# Patient Record
Sex: FEMALE | Race: WHITE | NOT HISPANIC OR LATINO | Employment: FULL TIME | URBAN - METROPOLITAN AREA
[De-identification: names, ages, dates, MRNs, and addresses within clinical notes are randomized per-mention and may not be internally consistent; named-entity substitution may affect disease eponyms.]

---

## 2018-03-09 ENCOUNTER — HOSPITAL ENCOUNTER (OUTPATIENT)
Dept: MAMMOGRAPHY | Facility: HOSPITAL | Age: 63
Discharge: HOME | End: 2018-03-09
Attending: FAMILY MEDICINE | Admitting: FAMILY MEDICINE

## 2020-01-23 ENCOUNTER — OFFICE VISIT (OUTPATIENT)
Dept: FAMILY MEDICINE CLINIC | Facility: CLINIC | Age: 65
End: 2020-01-23

## 2020-01-23 VITALS
HEIGHT: 63 IN | OXYGEN SATURATION: 96 % | RESPIRATION RATE: 18 BRPM | SYSTOLIC BLOOD PRESSURE: 117 MMHG | DIASTOLIC BLOOD PRESSURE: 78 MMHG | BODY MASS INDEX: 36.14 KG/M2 | WEIGHT: 204 LBS | TEMPERATURE: 98 F | HEART RATE: 85 BPM

## 2020-01-23 DIAGNOSIS — E78.5 DYSLIPIDEMIA: ICD-10-CM

## 2020-01-23 DIAGNOSIS — Z00.00 PHYSICAL EXAM: Primary | ICD-10-CM

## 2020-01-23 DIAGNOSIS — Z12.31 VISIT FOR SCREENING MAMMOGRAM: ICD-10-CM

## 2020-01-23 PROBLEM — Z13.220 ENCOUNTER FOR LIPID SCREENING FOR CARDIOVASCULAR DISEASE: Status: ACTIVE | Noted: 2018-01-22

## 2020-01-23 PROBLEM — Z13.6 ENCOUNTER FOR LIPID SCREENING FOR CARDIOVASCULAR DISEASE: Status: ACTIVE | Noted: 2018-01-22

## 2020-01-23 PROBLEM — E66.3 OVER WEIGHT: Status: ACTIVE | Noted: 2018-01-22

## 2020-01-23 PROBLEM — R92.8 OTHER ABNORMAL AND INCONCLUSIVE FINDINGS ON DIAGNOSTIC IMAGING OF BREAST: Status: ACTIVE | Noted: 2018-02-05

## 2020-01-23 PROBLEM — Z83.3 FAMILY HISTORY OF DIABETES MELLITUS IN FIRST DEGREE RELATIVE: Status: ACTIVE | Noted: 2018-01-22

## 2020-01-23 LAB
BILIRUB BLD-MCNC: NEGATIVE MG/DL
CLARITY, POC: CLEAR
COLOR UR: YELLOW
GLUCOSE UR STRIP-MCNC: NEGATIVE MG/DL
KETONES UR QL: NEGATIVE
LEUKOCYTE EST, POC: NEGATIVE
NITRITE UR-MCNC: NEGATIVE MG/ML
PH UR: 7.5 [PH] (ref 5–8)
PROT UR STRIP-MCNC: NEGATIVE MG/DL
RBC # UR STRIP: NEGATIVE /UL
SP GR UR: 1.02 (ref 1–1.03)
UROBILINOGEN UR QL: NORMAL

## 2020-01-23 PROCEDURE — 99396 PREV VISIT EST AGE 40-64: CPT | Performed by: FAMILY MEDICINE

## 2020-01-23 PROCEDURE — 81003 URINALYSIS AUTO W/O SCOPE: CPT | Performed by: FAMILY MEDICINE

## 2020-01-24 NOTE — PROGRESS NOTES
Subjective   Emily Drake is a 64 y.o. female.     Chief Complaint   Patient presents with   • Annual Exam         Current Outpatient Medications:   •  Calcium Carbonate-Vitamin D (CALCIUM 600+D) 600-200 MG-UNIT tablet, Daily., Disp: , Rfl:   •  Multiple Vitamins-Minerals (CENTRUM SILVER 50+WOMEN) tablet, Daily., Disp: , Rfl:     Past Medical History:   Diagnosis Date   • Overweight        Past Surgical History:   Procedure Laterality Date   • COLONOSCOPY  2013    NEG= 2013, Rech 2023 GSI       Family History   Problem Relation Age of Onset   • Diabetes Other         1ST DEGREE RELATIVE   • Arthritis Mother    • Hypertension Mother    • Other Mother         ESRD   • Hypertension Father    • Dementia Father    • Diabetes type II Father    • Other Sister         ARDS, DDDD   • Rheum arthritis Sister    • Sjogren's syndrome Sister    • Diabetes type II Brother        Social History     Socioeconomic History   • Marital status:      Spouse name: Not on file   • Number of children: Not on file   • Years of education: Not on file   • Highest education level: Not on file   Tobacco Use   • Smoking status: Never Smoker   • Smokeless tobacco: Never Used   Substance and Sexual Activity   • Alcohol use: Never     Frequency: Never       63 y/o C female here for annual PE/ Pap       The following portions of the patient's history were reviewed and updated as appropriate: allergies, current medications, past family history, past medical history, past social history, past surgical history and problem list.    Review of Systems   Constitutional: Negative.    HENT: Negative for congestion, ear pain, hearing loss, nosebleeds, postnasal drip, rhinorrhea, sinus pressure, sneezing, sore throat, tinnitus and trouble swallowing.    Eyes: Negative for blurred vision and double vision.   Respiratory: Negative for cough and shortness of breath.    Cardiovascular: Negative for chest pain.   Gastrointestinal: Negative for abdominal pain,  constipation, diarrhea, nausea, vomiting, GERD and indigestion.   Genitourinary: Negative for difficulty urinating, dysuria, flank pain, frequency, urgency and urinary incontinence.   Musculoskeletal: Negative for arthralgias and myalgias.   Skin: Negative for rash and skin lesions.   Neurological: Negative for weakness, memory problem and confusion.   Psychiatric/Behavioral: Negative for agitation, self-injury, suicidal ideas, depressed mood and stress. The patient is not nervous/anxious.        Vitals:    01/23/20 0905   BP: 117/78   Pulse: 85   Resp: 18   Temp: 98 °F (36.7 °C)   SpO2: 96%       Objective   Physical Exam   Constitutional: She is oriented to person, place, and time. She appears well-developed and well-nourished.   HENT:   Head: Normocephalic and atraumatic.   Right Ear: External ear normal.   Left Ear: External ear normal.   Nose: Nose normal.   Mouth/Throat: Oropharynx is clear and moist.   Eyes: Pupils are equal, round, and reactive to light. Conjunctivae and EOM are normal.   Neck: Normal range of motion. Neck supple. No thyromegaly present.   Cardiovascular: Normal rate, regular rhythm, normal heart sounds and intact distal pulses.   No murmur heard.  Pulmonary/Chest: Effort normal and breath sounds normal. No stridor. No respiratory distress. She has no wheezes. She has no rales. Right breast exhibits no inverted nipple, no mass, no nipple discharge, no skin change and no tenderness. Left breast exhibits no inverted nipple, no mass, no nipple discharge, no skin change and no tenderness. No breast swelling, tenderness, discharge or bleeding.   Abdominal: Soft. Bowel sounds are normal. She exhibits no distension and no mass. There is no tenderness. There is no rebound and no guarding. No hernia. Hernia confirmed negative in the right inguinal area and confirmed negative in the left inguinal area.   Genitourinary: Rectum normal, vagina normal and uterus normal. Rectal exam shows guaiac negative  stool. Pelvic exam was performed with patient supine. There is no rash or lesion on the right labia. There is no rash or lesion on the left labia. Uterus is anteverted. Cervix does not exhibit motion tenderness, discharge, friability, lesion, polyp or eversion. Right adnexum displays no mass, no tenderness and no fullness. Left adnexum displays no mass, no tenderness and no fullness. Vagina exhibits no lesion and no loss of rugae. No erythema, tenderness or bleeding in the vagina. No foreign body in the vagina. No vaginal discharge found. No cystocele or rectocele present.  Musculoskeletal: Normal range of motion. She exhibits no edema or tenderness.   Lymphadenopathy:     She has no cervical adenopathy.        Right: No inguinal adenopathy present.        Left: No inguinal adenopathy present.   Neurological: She is alert and oriented to person, place, and time. She displays normal reflexes. No cranial nerve deficit. She exhibits normal muscle tone.   Skin: Skin is warm and dry. Capillary refill takes less than 2 seconds. No rash noted. No erythema.   Psychiatric: She has a normal mood and affect. Her behavior is normal. Judgment and thought content normal.   Nursing note and vitals reviewed.        Assessment/Plan   Emily was seen today for annual exam.    Diagnoses and all orders for this visit:    Physical exam  -     POCT urinalysis dipstick, automated  -     PapIG HPV Age Gdln ACOG; Future  -     PapIG HPV Age Gdln ACOG  -     PapIG, HPV, Rfx 16 / 18; Future  -     PapIG, HPV, Rfx 16 / 18    Dyslipidemia    Visit for screening mammogram  -     Mammo Screening Digital Tomosynthesis Bilateral With CAD; Future    Disc'd healthy diet and exercise w/ pt

## 2020-01-28 LAB
AGE GDLN ACOG TESTING: NORMAL
CHROM ANALY OVERALL INTERP-IMP: NORMAL
CONV .: NORMAL
CONV PERFORMED BY:: NORMAL
DX ICD CODE: NORMAL
HIV 1 & 2 AB SER-IMP: NORMAL
HPV I/H RISK 1 DNA CVX QL PROBE+SIG AMP: NEGATIVE
REF LAB TEST METHOD: NORMAL
STAT OF ADQ CVX/VAG CYTO-IMP: NORMAL

## 2021-01-18 ENCOUNTER — TELEPHONE (OUTPATIENT)
Dept: FAMILY MEDICINE CLINIC | Facility: CLINIC | Age: 66
End: 2021-01-18

## 2021-01-18 DIAGNOSIS — Z13.220 SCREENING FOR LIPID DISORDERS: Primary | ICD-10-CM

## 2021-01-18 NOTE — TELEPHONE ENCOUNTER
Patient called and stated she has a PE appointment on 1/26/2021 and would like to know if you want labs on her prior to the appointment.  She is requesting they be ordered so she can schedule to come in and get them done.

## 2021-01-18 NOTE — TELEPHONE ENCOUNTER
HUB TO READ    Called pt to inform and to schedule nurse appointment for lab work.  Okay to transfer patient to office to schedule.

## 2021-01-19 ENCOUNTER — CLINICAL SUPPORT (OUTPATIENT)
Dept: FAMILY MEDICINE CLINIC | Facility: CLINIC | Age: 66
End: 2021-01-19

## 2021-01-19 DIAGNOSIS — Z13.220 SCREENING FOR LIPID DISORDERS: ICD-10-CM

## 2021-01-19 PROCEDURE — 80053 COMPREHEN METABOLIC PANEL: CPT | Performed by: FAMILY MEDICINE

## 2021-01-19 PROCEDURE — 80061 LIPID PANEL: CPT | Performed by: FAMILY MEDICINE

## 2021-01-19 PROCEDURE — 36415 COLL VENOUS BLD VENIPUNCTURE: CPT | Performed by: FAMILY MEDICINE

## 2021-01-20 LAB
ALBUMIN SERPL-MCNC: 4.3 G/DL (ref 3.5–5.2)
ALBUMIN/GLOB SERPL: 1.8 G/DL
ALP SERPL-CCNC: 132 U/L (ref 39–117)
ALT SERPL W P-5'-P-CCNC: 18 U/L (ref 1–33)
ANION GAP SERPL CALCULATED.3IONS-SCNC: 8.9 MMOL/L (ref 5–15)
AST SERPL-CCNC: 17 U/L (ref 1–32)
BILIRUB SERPL-MCNC: 0.2 MG/DL (ref 0–1.2)
BUN SERPL-MCNC: 13 MG/DL (ref 8–23)
BUN/CREAT SERPL: 18.6 (ref 7–25)
CALCIUM SPEC-SCNC: 9.4 MG/DL (ref 8.6–10.5)
CHLORIDE SERPL-SCNC: 108 MMOL/L (ref 98–107)
CHOLEST SERPL-MCNC: 157 MG/DL (ref 0–200)
CO2 SERPL-SCNC: 25.1 MMOL/L (ref 22–29)
CREAT SERPL-MCNC: 0.7 MG/DL (ref 0.57–1)
GFR SERPL CREATININE-BSD FRML MDRD: 85 ML/MIN/1.73
GLOBULIN UR ELPH-MCNC: 2.4 GM/DL
GLUCOSE SERPL-MCNC: 99 MG/DL (ref 65–99)
HDLC SERPL-MCNC: 53 MG/DL (ref 40–60)
LDLC SERPL CALC-MCNC: 79 MG/DL (ref 0–100)
LDLC/HDLC SERPL: 1.43 {RATIO}
POTASSIUM SERPL-SCNC: 4.3 MMOL/L (ref 3.5–5.2)
PROT SERPL-MCNC: 6.7 G/DL (ref 6–8.5)
SODIUM SERPL-SCNC: 142 MMOL/L (ref 136–145)
TRIGL SERPL-MCNC: 141 MG/DL (ref 0–150)
VLDLC SERPL-MCNC: 25 MG/DL (ref 5–40)

## 2021-01-26 ENCOUNTER — OFFICE VISIT (OUTPATIENT)
Dept: FAMILY MEDICINE CLINIC | Facility: CLINIC | Age: 66
End: 2021-01-26

## 2021-01-26 VITALS
HEART RATE: 79 BPM | WEIGHT: 190 LBS | OXYGEN SATURATION: 99 % | SYSTOLIC BLOOD PRESSURE: 116 MMHG | DIASTOLIC BLOOD PRESSURE: 80 MMHG | BODY MASS INDEX: 33.66 KG/M2 | HEIGHT: 63 IN | RESPIRATION RATE: 16 BRPM | TEMPERATURE: 97.3 F

## 2021-01-26 DIAGNOSIS — Z78.0 POSTMENOPAUSAL: ICD-10-CM

## 2021-01-26 DIAGNOSIS — Z00.00 ANNUAL PHYSICAL EXAM: Primary | ICD-10-CM

## 2021-01-26 DIAGNOSIS — Z12.31 VISIT FOR SCREENING MAMMOGRAM: ICD-10-CM

## 2021-01-26 LAB
BILIRUB BLD-MCNC: NEGATIVE MG/DL
CLARITY, POC: CLEAR
COLOR UR: YELLOW
GLUCOSE UR STRIP-MCNC: NEGATIVE MG/DL
KETONES UR QL: NEGATIVE
LEUKOCYTE EST, POC: NEGATIVE
NITRITE UR-MCNC: NEGATIVE MG/ML
PH UR: 6.5 [PH] (ref 5–8)
PROT UR STRIP-MCNC: NEGATIVE MG/DL
RBC # UR STRIP: NEGATIVE /UL
SP GR UR: 1.01 (ref 1–1.03)
UROBILINOGEN UR QL: NORMAL

## 2021-01-26 PROCEDURE — 36415 COLL VENOUS BLD VENIPUNCTURE: CPT | Performed by: FAMILY MEDICINE

## 2021-01-26 PROCEDURE — 82306 VITAMIN D 25 HYDROXY: CPT | Performed by: FAMILY MEDICINE

## 2021-01-26 PROCEDURE — 81003 URINALYSIS AUTO W/O SCOPE: CPT | Performed by: FAMILY MEDICINE

## 2021-01-26 PROCEDURE — 99396 PREV VISIT EST AGE 40-64: CPT | Performed by: FAMILY MEDICINE

## 2021-01-26 NOTE — PROGRESS NOTES
Subjective   Emily Drake is a 65 y.o. female.     Chief Complaint   Patient presents with   • Annual Exam     Physical/No papsmear         Current Outpatient Medications:   •  calcium carb-cholecalciferol (Calcium 600+D) 600-800 MG-UNIT tablet, Take 1 tablet by mouth Daily., Disp: , Rfl:   •  Multiple Vitamins-Minerals (CENTRUM SILVER 50+WOMEN) tablet, Daily., Disp: , Rfl:     History reviewed. No pertinent past medical history.    Past Surgical History:   Procedure Laterality Date   • COLONOSCOPY  2013    NEG= 2013, Rech 2023 GSI       Family History   Problem Relation Age of Onset   • Diabetes Other         1ST DEGREE RELATIVE   • Arthritis Mother    • Hypertension Mother    • Kidney disease Mother    • Hypertension Father    • Dementia Father    • Diabetes Father    • Rheum arthritis Sister    • Arthritis Sister         RA/ DDD L Spine   • Diabetes Brother    • Sjogren's syndrome Sister    • Other Sister         Autoimmune Dx like SLE       Social History     Socioeconomic History   • Marital status:      Spouse name: Not on file   • Number of children: Not on file   • Years of education: Not on file   • Highest education level: Not on file   Tobacco Use   • Smoking status: Never Smoker   • Smokeless tobacco: Never Used   Substance and Sexual Activity   • Alcohol use: Never     Frequency: Never   • Drug use: Never   • Sexual activity: Defer       66y/o C female here for annual PE w/o pap       The following portions of the patient's history were reviewed and updated as appropriate: allergies, current medications, past family history, past medical history, past social history, past surgical history and problem list.    Review of Systems   Constitutional: Negative for activity change, appetite change, fatigue, unexpected weight gain and unexpected weight loss.   HENT: Negative for congestion, ear pain, hearing loss, nosebleeds, postnasal drip, rhinorrhea, sinus pressure, sneezing, sore throat, tinnitus and  trouble swallowing.    Eyes: Negative for blurred vision and double vision.   Respiratory: Negative for cough and shortness of breath.    Cardiovascular: Negative for chest pain.   Gastrointestinal: Negative for abdominal pain, constipation, diarrhea, nausea, vomiting, GERD and indigestion.   Genitourinary: Negative for difficulty urinating, dysuria, flank pain, frequency, urgency and urinary incontinence.   Musculoskeletal: Negative for arthralgias and myalgias.   Skin: Negative for rash and skin lesions.   Neurological: Negative for weakness, memory problem and confusion.   Psychiatric/Behavioral: Negative for agitation, self-injury, suicidal ideas, depressed mood and stress. The patient is not nervous/anxious.        Vitals:    01/26/21 1441   BP: 116/80   Pulse: 79   Resp: 16   Temp: 97.3 °F (36.3 °C)   SpO2: 99%       Objective   Physical Exam  Vitals signs and nursing note reviewed.   Constitutional:       General: She is not in acute distress.     Appearance: Normal appearance. She is well-developed. She is not ill-appearing, toxic-appearing or diaphoretic.   HENT:      Head: Normocephalic and atraumatic.      Right Ear: Tympanic membrane, ear canal and external ear normal. There is no impacted cerumen.      Left Ear: Tympanic membrane, ear canal and external ear normal. There is no impacted cerumen.      Nose: Nose normal. No congestion or rhinorrhea.      Mouth/Throat:      Mouth: Mucous membranes are moist.      Pharynx: No oropharyngeal exudate or posterior oropharyngeal erythema.   Eyes:      Extraocular Movements: Extraocular movements intact.      Conjunctiva/sclera: Conjunctivae normal.      Pupils: Pupils are equal, round, and reactive to light.   Neck:      Musculoskeletal: Normal range of motion and neck supple.   Cardiovascular:      Rate and Rhythm: Normal rate and regular rhythm.      Heart sounds: Normal heart sounds. No murmur.   Pulmonary:      Effort: Pulmonary effort is normal. No  respiratory distress.      Breath sounds: Normal breath sounds. No wheezing.   Abdominal:      General: Bowel sounds are normal. There is no distension.      Palpations: Abdomen is soft. There is no mass.      Tenderness: There is no abdominal tenderness. There is no guarding or rebound.      Hernia: No hernia is present.   Musculoskeletal: Normal range of motion.      Right lower leg: No edema.      Left lower leg: No edema.   Lymphadenopathy:      Cervical: No cervical adenopathy.   Skin:     General: Skin is warm and dry.      Findings: No rash.   Neurological:      General: No focal deficit present.      Mental Status: She is alert and oriented to person, place, and time.      Cranial Nerves: No cranial nerve deficit.   Psychiatric:         Mood and Affect: Mood normal.         Behavior: Behavior normal.         Thought Content: Thought content normal.         Judgment: Judgment normal.           Assessment/Plan   Diagnoses and all orders for this visit:    1. Annual physical exam (Primary)  -     POC Urinalysis Dipstick, Automated    2. Postmenopausal  -     Vitamin D 25 hydroxy; Future  -     Vitamin D 25 hydroxy    3. Visit for screening mammogram  -     Mammo Screening Digital Tomosynthesis Bilateral With CAD; Future      Pt to cont w/ Calcium in diet/ check vit D and walk for exercise  Pt to maintain healthy diet

## 2021-01-27 LAB — 25(OH)D3 SERPL-MCNC: 44.3 NG/ML (ref 30–100)

## 2021-05-10 DIAGNOSIS — Z12.31 VISIT FOR SCREENING MAMMOGRAM: ICD-10-CM

## 2022-07-05 ENCOUNTER — OFFICE VISIT (OUTPATIENT)
Dept: FAMILY MEDICINE CLINIC | Facility: CLINIC | Age: 67
End: 2022-07-05

## 2022-07-05 VITALS
WEIGHT: 190 LBS | DIASTOLIC BLOOD PRESSURE: 61 MMHG | HEIGHT: 63 IN | TEMPERATURE: 98.4 F | BODY MASS INDEX: 33.66 KG/M2 | RESPIRATION RATE: 14 BRPM | SYSTOLIC BLOOD PRESSURE: 93 MMHG | OXYGEN SATURATION: 96 % | HEART RATE: 92 BPM

## 2022-07-05 DIAGNOSIS — R50.9 FEVER, UNSPECIFIED FEVER CAUSE: ICD-10-CM

## 2022-07-05 DIAGNOSIS — J01.90 ACUTE NON-RECURRENT SINUSITIS, UNSPECIFIED LOCATION: Primary | ICD-10-CM

## 2022-07-05 DIAGNOSIS — R74.8 ELEVATED LIVER ENZYMES: ICD-10-CM

## 2022-07-05 DIAGNOSIS — E87.1 HYPONATREMIA: ICD-10-CM

## 2022-07-05 DIAGNOSIS — R51.9 NONINTRACTABLE HEADACHE, UNSPECIFIED CHRONICITY PATTERN, UNSPECIFIED HEADACHE TYPE: ICD-10-CM

## 2022-07-05 LAB
EXPIRATION DATE: NORMAL
FLUAV AG UPPER RESP QL IA.RAPID: NOT DETECTED
FLUBV AG UPPER RESP QL IA.RAPID: NOT DETECTED
INTERNAL CONTROL: NORMAL
Lab: NORMAL
SARS-COV-2 AG UPPER RESP QL IA.RAPID: NOT DETECTED

## 2022-07-05 PROCEDURE — 99213 OFFICE O/P EST LOW 20 MIN: CPT | Performed by: FAMILY MEDICINE

## 2022-07-05 PROCEDURE — 80053 COMPREHEN METABOLIC PANEL: CPT | Performed by: FAMILY MEDICINE

## 2022-07-05 PROCEDURE — 87428 SARSCOV & INF VIR A&B AG IA: CPT | Performed by: FAMILY MEDICINE

## 2022-07-05 RX ORDER — AMOXICILLIN 875 MG/1
875 TABLET, COATED ORAL 2 TIMES DAILY
Qty: 28 TABLET | Refills: 0 | Status: SHIPPED | OUTPATIENT
Start: 2022-07-05 | End: 2022-07-18

## 2022-07-05 NOTE — PROGRESS NOTES
Venipuncture performed in L ARM by RT Adonay  with good hemostasis. Patient tolerated well. 07/05/22 Amber Taylor, RT

## 2022-07-05 NOTE — PROGRESS NOTES
Subjective   Emily Drake is a 66 y.o. female.     Chief Complaint   Patient presents with   • Fever     Unexplained fever x5days. Patient has also had a headache,extreme fatigue,diarrhea off/on,sore throat,sinus congestion,chills,no appetite. Patient took Tylenol at 9:00. Patient states that her sodium was low at 126 in the hospital and her COVID test on Saturday was negative.          Current Outpatient Medications:   •  calcium carb-cholecalciferol 600-800 MG-UNIT tablet, Take 1 tablet by mouth Daily., Disp: , Rfl:   •  COLLAGEN PO, 1 po qd, Disp: , Rfl:   •  Multiple Vitamins-Minerals (CENTRUM SILVER 50+WOMEN) tablet, Daily., Disp: , Rfl:   •  amoxicillin-clavulanate XR (Augmentin XR) 1000-62.5 MG per 12 hr tablet, Take 2 tablets by mouth 2 (Two) Times a Day., Disp: 40 tablet, Rfl: 0  •  aspirin-acetaminophen-caffeine (EXCEDRIN MIGRAINE) 250-250-65 MG per tablet, Take 1 tablet by mouth Every 6 (Six) Hours As Needed for Headache., Disp: , Rfl:   •  cholecalciferol (Cholecalciferol) 25 MCG (1000 UT) tablet, Take 2,000 Units by mouth Daily., Disp: , Rfl:   •  fluticasone (FLONASE) 50 MCG/ACT nasal spray, 2 sprays into the nostril(s) as directed by provider Daily., Disp: , Rfl:   •  Phenylephrine-DM-GG 10- MG tablet, Take  by mouth., Disp: , Rfl:   •  promethazine (PHENERGAN) 25 MG tablet, Take 1 tablet by mouth Every 6 (Six) Hours As Needed for Nausea or Vomiting., Disp: 25 tablet, Rfl: 0  •  Rimegepant Sulfate (Nurtec) 75 MG tablet dispersible tablet, Take 1 tablet by mouth 1 (One) Time As Needed (for Headache x1) for up to 1 dose., Disp: 2 tablet, Rfl: 0  •  ubrogepant (ubrogepant) 100 MG tablet, 1 po prn headache and may repeat x 1 in 2hrs if needed, Disp: 2 tablet, Rfl: 0    Past Medical History:   Diagnosis Date   • MAMMO     NEG = 2021       Past Surgical History:   Procedure Laterality Date   • COLONOSCOPY  2013    NEG= 2013, Rech 2023 GSI       Family History   Problem Relation Age of Onset   •  Diabetes Other         1ST DEGREE RELATIVE   • Arthritis Mother    • Hypertension Mother    • Kidney disease Mother    • Hypertension Father    • Dementia Father    • Diabetes Father    • Rheum arthritis Sister    • Arthritis Sister         RA/ DDD L Spine   • Diabetes Brother    • Sjogren's syndrome Sister    • Other Sister         Autoimmune Dx like SLE       Social History     Socioeconomic History   • Marital status:    Tobacco Use   • Smoking status: Never Smoker   • Smokeless tobacco: Never Used   Vaping Use   • Vaping Use: Never used   Substance and Sexual Activity   • Alcohol use: Never   • Drug use: Never   • Sexual activity: Defer       67 y/o C female here w/ c/o's fever/ HA/ Fatigue    Unexplained fever x 5days. Patient has also had a headache, extreme fatigue, diarrhea off/on,sore throat, sinus congestion, chills, no appetite. Patient took Tylenol at 9:00am.  Pt states she went to ER on Sun early am and tested NEG for COVID/ Flu and had labs drawn but sent home and told to f/u------ due to her sodium= 126 in the hospital     Pt states it started last Tues -------not feeling well but cont'd to work until Thurs even when above symptoms started; Pt did not have N/V and diarrhea was  only intermittent         The following portions of the patient's history were reviewed and updated as appropriate: allergies, current medications, past family history, past medical history, past social history, past surgical history and problem list.    Review of Systems   Constitutional: Positive for activity change, appetite change, chills and fever.   HENT: Positive for congestion, sinus pressure and sore throat.    Gastrointestinal: Positive for diarrhea, nausea and vomiting.   Neurological: Positive for headache.       Vitals:    07/05/22 1049   BP: 93/61   Pulse: 92   Resp: 14   Temp: 98.4 °F (36.9 °C)   SpO2: 96%       Objective   Physical Exam  Vitals and nursing note reviewed.   Constitutional:       General:  She is not in acute distress.     Appearance: She is well-developed. She is not ill-appearing, toxic-appearing or diaphoretic.   HENT:      Head: Normocephalic and atraumatic.      Right Ear: Tympanic membrane, ear canal and external ear normal. There is no impacted cerumen.      Left Ear: Tympanic membrane, ear canal and external ear normal. There is no impacted cerumen.      Nose: Congestion present. No rhinorrhea.      Right Nostril: Epistaxis present.      Left Nostril: No epistaxis.      Left Turbinates: Pale.      Right Sinus: Frontal sinus tenderness present. No maxillary sinus tenderness.      Left Sinus: Frontal sinus tenderness present. No maxillary sinus tenderness.      Mouth/Throat:      Mouth: Mucous membranes are moist.      Pharynx: Oropharyngeal exudate present. No posterior oropharyngeal erythema.     Eyes:      General: No scleral icterus.        Right eye: No discharge.         Left eye: No discharge.      Extraocular Movements: Extraocular movements intact.      Conjunctiva/sclera: Conjunctivae normal.      Pupils: Pupils are equal, round, and reactive to light.   Neck:      Thyroid: No thyromegaly.   Cardiovascular:      Rate and Rhythm: Normal rate and regular rhythm.      Heart sounds: Normal heart sounds. No murmur heard.  Pulmonary:      Effort: Pulmonary effort is normal. No respiratory distress.      Breath sounds: Normal breath sounds. No wheezing or rales.   Musculoskeletal:      Cervical back: Normal range of motion and neck supple.   Lymphadenopathy:      Cervical: No cervical adenopathy.   Skin:     General: Skin is warm and dry.      Coloration: Skin is not pale.      Findings: No erythema or rash.   Neurological:      Mental Status: She is alert and oriented to person, place, and time.      Cranial Nerves: No cranial nerve deficit.   Psychiatric:         Attention and Perception: Attention normal.         Mood and Affect: Mood and affect normal.         Speech: Speech normal.          Behavior: Behavior normal. Behavior is cooperative.         Thought Content: Thought content normal.         Cognition and Memory: Cognition and memory normal.         Judgment: Judgment normal.           Assessment & Plan   Diagnoses and all orders for this visit:    1. Acute non-recurrent sinusitis, unspecified location (Primary)    2. Hyponatremia  -     Comprehensive Metabolic Panel    3. Elevated liver enzymes  -     Comprehensive Metabolic Panel    4. Fever, unspecified fever cause  -     Cancel: Respiratory Panel PCR w/COVID-19(SARS-CoV-2) [facility]/JULIANN/VALENTE/PAD/[facility]/MAD/NATALIE In-House, NP Swab in UTM/VTM, 3-4 HR TAT - Swab, Nasopharynx; Future  -     POCT SARS-CoV-2 Antigen [facility]    5. Nonintractable headache, unspecified chronicity pattern, unspecified headache type  -     Cancel: Respiratory Panel PCR w/COVID-19(SARS-CoV-2) [facility]/JULIANN/VALENTE/[facility]/NATALIE In-House, NP Swab in UTM/VTM, 3-4 HR TAT - Swab, Nasopharynx; Future  -     POCT SARS-CoV-2 Antigen [facility]    Other orders  -     Discontinue: amoxicillin (AMOXIL) 875 MG tablet; Take 1 tablet by mouth 2 (Two) Times a Day for 14 days.  Dispense: 28 tablet; Refill: 0

## 2022-07-06 ENCOUNTER — TELEPHONE (OUTPATIENT)
Dept: FAMILY MEDICINE CLINIC | Facility: CLINIC | Age: 67
End: 2022-07-06

## 2022-07-06 LAB
ALBUMIN SERPL-MCNC: 3.9 G/DL (ref 3.5–5.2)
ALBUMIN/GLOB SERPL: 1.5 G/DL
ALP SERPL-CCNC: 104 U/L (ref 39–117)
ALT SERPL W P-5'-P-CCNC: 32 U/L (ref 1–33)
ANION GAP SERPL CALCULATED.3IONS-SCNC: 12.5 MMOL/L (ref 5–15)
AST SERPL-CCNC: 35 U/L (ref 1–32)
BILIRUB SERPL-MCNC: 0.6 MG/DL (ref 0–1.2)
BUN SERPL-MCNC: 9 MG/DL (ref 8–23)
BUN/CREAT SERPL: 12.9 (ref 7–25)
CALCIUM SPEC-SCNC: 9.8 MG/DL (ref 8.6–10.5)
CHLORIDE SERPL-SCNC: 99 MMOL/L (ref 98–107)
CO2 SERPL-SCNC: 22.5 MMOL/L (ref 22–29)
CREAT SERPL-MCNC: 0.7 MG/DL (ref 0.57–1)
EGFRCR SERPLBLD CKD-EPI 2021: 96.7 ML/MIN/1.73
GLOBULIN UR ELPH-MCNC: 2.6 GM/DL
GLUCOSE SERPL-MCNC: 109 MG/DL (ref 65–99)
POTASSIUM SERPL-SCNC: 3.4 MMOL/L (ref 3.5–5.2)
PROT SERPL-MCNC: 6.5 G/DL (ref 6–8.5)
SODIUM SERPL-SCNC: 134 MMOL/L (ref 136–145)

## 2022-07-06 NOTE — TELEPHONE ENCOUNTER
"PATIENT IS NEEDING NEW \"RETURN TO WORK NOTE\"   WITH THE NEW DATES       7-1-22- THRU 7-8-22     PLEASE CALL WHEN READY TO    AT :   [phone]  "

## 2022-07-07 ENCOUNTER — TELEPHONE (OUTPATIENT)
Dept: FAMILY MEDICINE CLINIC | Facility: CLINIC | Age: 67
End: 2022-07-07

## 2022-07-07 NOTE — TELEPHONE ENCOUNTER
Caller: [facility]GS    Relationship: Emergency Contact    Best call back number: [phone]*    What was the call regarding: PATIENT SPOUSE CALLING STATING THAT THE RETURN TO WORK NOTE NEEDS TO STATE THAT PATIENT CAN RETURN TO WORK ON 7/11/22. [facility] WOULD LIKE TO PICK THIS WORK NOTE UP FROM THE OFFICE TODAY OR TOMORROW.    Do you require a callback: YES, TO ADVISE WHEN WORK NOTE IS READY TO BE PICKED UP.

## 2022-07-07 NOTE — TELEPHONE ENCOUNTER
Caller: [facility] DIGGS    Relationship: Emergency Contact    Best call back number: [phone]*    What medication are you requesting: PAIN RELIEF FOR HEADACHES    What are your current symptoms: HEADACHES    If a prescription is needed, what is your preferred pharmacy and phone number: [facility] [address] [place] [address] - [phone] [facility] [phone]      Additional notes: PATIENT'S , CORONA, CALLING STATING THAT THE PATIENT'S FEVER HAS BROKE, BUT STILL HAVING HEADACHES AND TYLENOL NOT HELPING. REQUESTING SOMETHING TO BE SENT TO THE PATIENT'S PHARMACY FOR HEADACHE RELIEF.    [facility] REQUEST A CALL BACK WHEN SOMETHING HAS BEEN SENT TO THE PHARMACY.

## 2022-07-07 NOTE — TELEPHONE ENCOUNTER
No, she's only been taking 400mg q6hr w tylenol. She will try 800mg q6-8 hrs and see if that helps.

## 2022-07-14 ENCOUNTER — OFFICE VISIT (OUTPATIENT)
Dept: FAMILY MEDICINE CLINIC | Facility: CLINIC | Age: 67
End: 2022-07-14

## 2022-07-14 ENCOUNTER — HOSPITAL ENCOUNTER (OUTPATIENT)
Dept: CT IMAGING | Facility: HOSPITAL | Age: 67
Discharge: HOME | End: 2022-07-14
Admitting: FAMILY MEDICINE

## 2022-07-14 VITALS
BODY MASS INDEX: 34.38 KG/M2 | HEIGHT: 63 IN | DIASTOLIC BLOOD PRESSURE: 68 MMHG | TEMPERATURE: 99.8 F | SYSTOLIC BLOOD PRESSURE: 115 MMHG | WEIGHT: 194 LBS | OXYGEN SATURATION: 98 % | RESPIRATION RATE: 18 BRPM | HEART RATE: 101 BPM

## 2022-07-14 DIAGNOSIS — R51.9 INTRACTABLE HEADACHE, UNSPECIFIED CHRONICITY PATTERN, UNSPECIFIED HEADACHE TYPE: Primary | ICD-10-CM

## 2022-07-14 DIAGNOSIS — R51.9 INTRACTABLE HEADACHE, UNSPECIFIED CHRONICITY PATTERN, UNSPECIFIED HEADACHE TYPE: ICD-10-CM

## 2022-07-14 DIAGNOSIS — J01.00 SUBACUTE MAXILLARY SINUSITIS: ICD-10-CM

## 2022-07-14 DIAGNOSIS — R50.9 FEVER, UNSPECIFIED FEVER CAUSE: ICD-10-CM

## 2022-07-14 PROCEDURE — 87428 SARSCOV & INF VIR A&B AG IA: CPT | Performed by: FAMILY MEDICINE

## 2022-07-14 PROCEDURE — 70450 CT HEAD/BRAIN W/O DYE: CPT

## 2022-07-14 PROCEDURE — 99213 OFFICE O/P EST LOW 20 MIN: CPT | Performed by: FAMILY MEDICINE

## 2022-07-14 RX ORDER — MELATONIN
2000 DAILY
COMMUNITY

## 2022-07-14 RX ORDER — PROMETHAZINE HYDROCHLORIDE 25 MG/1
25 TABLET ORAL EVERY 6 HOURS PRN
Qty: 25 TABLET | Refills: 0 | Status: SHIPPED | OUTPATIENT
Start: 2022-07-14 | End: 2023-01-30

## 2022-07-14 RX ORDER — FLUTICASONE PROPIONATE 50 MCG
2 SPRAY, SUSPENSION (ML) NASAL DAILY
COMMUNITY
End: 2023-01-30

## 2022-07-14 RX ORDER — RIMEGEPANT SULFATE 75 MG/75MG
1 TABLET, ORALLY DISINTEGRATING ORAL ONCE AS NEEDED
Qty: 2 TABLET | Refills: 0 | COMMUNITY
Start: 2022-07-14 | End: 2023-01-30

## 2022-07-14 RX ORDER — ACETAMINOPHEN, ASPIRIN AND CAFFEINE 250; 250; 65 MG/1; MG/1; MG/1
1 TABLET, FILM COATED ORAL EVERY 6 HOURS PRN
COMMUNITY
End: 2023-01-30

## 2022-07-14 NOTE — PROGRESS NOTES
Subjective   Emily Drake is a 66 y.o. female.     Chief Complaint   Patient presents with   • Headache   • Fever   • Nausea         Current Outpatient Medications:   •  amoxicillin (AMOXIL) 875 MG tablet, Take 1 tablet by mouth 2 (Two) Times a Day for 14 days., Disp: 28 tablet, Rfl: 0  •  aspirin-acetaminophen-caffeine (EXCEDRIN MIGRAINE) 250-250-65 MG per tablet, Take 1 tablet by mouth Every 6 (Six) Hours As Needed for Headache., Disp: , Rfl:   •  calcium carb-cholecalciferol 600-800 MG-UNIT tablet, Take 1 tablet by mouth Daily., Disp: , Rfl:   •  cholecalciferol (Cholecalciferol) 25 MCG (1000 UT) tablet, Take 2,000 Units by mouth Daily., Disp: , Rfl:   •  COLLAGEN PO, 1 po qd, Disp: , Rfl:   •  fluticasone (FLONASE) 50 MCG/ACT nasal spray, 2 sprays into the nostril(s) as directed by provider Daily., Disp: , Rfl:   •  Multiple Vitamins-Minerals (CENTRUM SILVER 50+WOMEN) tablet, Daily., Disp: , Rfl:   •  Phenylephrine-DM-GG 10- MG tablet, Take  by mouth., Disp: , Rfl:   •  promethazine (PHENERGAN) 25 MG tablet, Take 1 tablet by mouth Every 6 (Six) Hours As Needed for Nausea or Vomiting., Disp: 25 tablet, Rfl: 0  •  Rimegepant Sulfate (Nurtec) 75 MG tablet dispersible tablet, Take 1 tablet by mouth 1 (One) Time As Needed (for Headache x1) for up to 1 dose., Disp: 2 tablet, Rfl: 0  •  ubrogepant (ubrogepant) 100 MG tablet, 1 po prn headache and may repeat x 1 in 2hrs if needed, Disp: 2 tablet, Rfl: 0    Past Medical History:   Diagnosis Date   • MAMMO     NEG = 2021       Past Surgical History:   Procedure Laterality Date   • COLONOSCOPY  2013    NEG= 2013, Rech 2023 [facility]       Family History   Problem Relation Age of Onset   • Diabetes Other         1ST DEGREE RELATIVE   • Arthritis Mother    • Hypertension Mother    • Kidney disease Mother    • Hypertension Father    • Dementia Father    • Diabetes Father    • Rheum arthritis Sister    • Arthritis Sister         RA/ DDD L Spine   • Diabetes Brother    •  Sjogren's syndrome Sister    • Other Sister         Autoimmune Dx like SLE       Social History     Socioeconomic History   • Marital status:    Tobacco Use   • Smoking status: Never Smoker   • Smokeless tobacco: Never Used   Vaping Use   • Vaping Use: Never used   Substance and Sexual Activity   • Alcohol use: Never   • Drug use: Never   • Sexual activity: Defer       67 y/o C female here w/ her  for f/u from recent viral syndrome/ hyponatremia    Early July, pt seen in ER for viral syndrome and found to have a low sodium level; pt f/u'd here a couple days later and was checked for Covid again and NEG; pt given amox for sinusitis and Ib/Tyl for HA's  Pt states she is almost done w/ abx and added flonase/ excedrin and phenylenephrine for symptom relief;   Pt eating and drinking ok but HA wont stay away; no visual changes w/ the HA but some Nausea; HA is a constant ache @ left frontal area       The following portions of the patient's history were reviewed and updated as appropriate: allergies, current medications, past family history, past medical history, past social history, past surgical history and problem list.    Review of Systems   Constitutional: Negative for chills, fatigue and fever.   HENT: Positive for congestion and sinus pressure. Negative for ear discharge, ear pain, postnasal drip, rhinorrhea, sneezing, sore throat and swollen glands.    Eyes: Negative for pain, discharge, redness, itching and visual disturbance.   Respiratory: Negative for cough, shortness of breath, wheezing and stridor.    Gastrointestinal: Positive for nausea. Negative for vomiting.   Skin: Negative for rash.   Allergic/Immunologic: Negative for environmental allergies.   Neurological: Positive for headache.   Psychiatric/Behavioral: Negative for sleep disturbance.       Vitals:    07/14/22 1118   BP: 115/68   Pulse: 101   Resp: 18   Temp: 99.8 °F (37.7 °C)   SpO2: 98%       Objective   Physical Exam  Vitals and  nursing note reviewed.   Constitutional:       Appearance: She is well-developed.   HENT:      Head: Normocephalic and atraumatic.   Cardiovascular:      Rate and Rhythm: Normal rate and regular rhythm.      Heart sounds: Normal heart sounds. No murmur heard.  Pulmonary:      Effort: Pulmonary effort is normal.      Breath sounds: Normal breath sounds.   Musculoskeletal:      Cervical back: Normal range of motion and neck supple.   Skin:     General: Skin is warm and dry.      Findings: No rash.   Neurological:      Mental Status: She is alert and oriented to person, place, and time.   Psychiatric:         Behavior: Behavior normal.         Thought Content: Thought content normal.         Judgment: Judgment normal.           Assessment & Plan   Diagnoses and all orders for this visit:    1. Intractable headache, unspecified chronicity pattern, unspecified headache type (Primary)  -     POCT SARS-CoV-2 Antigen SALEEM + Flu  -     CT Head Without Contrast; Future    2. Subacute maxillary sinusitis    3. Fever, unspecified fever cause  -     POCT SARS-CoV-2 Antigen SALEEM + Flu    Other orders  -     Rimegepant Sulfate (Nurtec) 75 MG tablet dispersible tablet; Take 1 tablet by mouth 1 (One) Time As Needed (for Headache x1) for up to 1 dose.  Dispense: 2 tablet; Refill: 0  -     ubrogepant (ubrogepant) 100 MG tablet; 1 po prn headache and may repeat x 1 in 2hrs if needed  Dispense: 2 tablet; Refill: 0  -     promethazine (PHENERGAN) 25 MG tablet; Take 1 tablet by mouth Every 6 (Six) Hours As Needed for Nausea or Vomiting.  Dispense: 25 tablet; Refill: 0    Trial of migraine meds and phenergan to assist w/ sleep/ rest  COVID/ FLU= NEG  CT Head due to prolonged HA

## 2022-07-18 RX ORDER — AMOXICILLIN AND CLAVULANATE POTASSIUM 562.5; 437.5; 62.5 MG/1; MG/1; MG/1
2 TABLET, FILM COATED, EXTENDED RELEASE ORAL 2 TIMES DAILY
Qty: 40 TABLET | Refills: 0 | Status: SHIPPED | OUTPATIENT
Start: 2022-07-18 | End: 2023-01-30

## 2022-08-12 ENCOUNTER — LAB REQUISITION (OUTPATIENT)
Dept: LAB | Facility: HOSPITAL | Age: 67
End: 2022-08-12

## 2022-08-12 DIAGNOSIS — J01.01 ACUTE RECURRENT MAXILLARY SINUSITIS: ICD-10-CM

## 2022-08-12 PROCEDURE — 87070 CULTURE OTHR SPECIMN AEROBIC: CPT | Performed by: OTOLARYNGOLOGY

## 2022-08-12 PROCEDURE — 87205 SMEAR GRAM STAIN: CPT | Performed by: OTOLARYNGOLOGY

## 2022-08-12 PROCEDURE — 87186 SC STD MICRODIL/AGAR DIL: CPT | Performed by: OTOLARYNGOLOGY

## 2022-08-12 PROCEDURE — 87185 SC STD ENZYME DETCJ PER NZM: CPT | Performed by: OTOLARYNGOLOGY

## 2022-08-12 PROCEDURE — 87077 CULTURE AEROBIC IDENTIFY: CPT | Performed by: OTOLARYNGOLOGY

## 2022-08-16 LAB
BACTERIA SPEC AEROBE CULT: ABNORMAL
BACTERIA SPEC AEROBE CULT: ABNORMAL
GRAM STN SPEC: ABNORMAL
GRAM STN SPEC: ABNORMAL

## 2022-09-30 ENCOUNTER — ON CAMPUS - OUTPATIENT (OUTPATIENT)
Dept: URBAN - METROPOLITAN AREA HOSPITAL 2 | Facility: HOSPITAL | Age: 67
End: 2022-09-30

## 2022-09-30 VITALS
DIASTOLIC BLOOD PRESSURE: 72 MMHG | HEART RATE: 79 BPM | HEART RATE: 77 BPM | DIASTOLIC BLOOD PRESSURE: 59 MMHG | DIASTOLIC BLOOD PRESSURE: 70 MMHG | SYSTOLIC BLOOD PRESSURE: 119 MMHG | OXYGEN SATURATION: 93 % | RESPIRATION RATE: 25 BRPM | RESPIRATION RATE: 22 BRPM | SYSTOLIC BLOOD PRESSURE: 113 MMHG | DIASTOLIC BLOOD PRESSURE: 78 MMHG | TEMPERATURE: 97.6 F | HEART RATE: 81 BPM | HEART RATE: 88 BPM | SYSTOLIC BLOOD PRESSURE: 126 MMHG | HEIGHT: 63 IN | OXYGEN SATURATION: 95 % | DIASTOLIC BLOOD PRESSURE: 82 MMHG | RESPIRATION RATE: 16 BRPM | SYSTOLIC BLOOD PRESSURE: 122 MMHG | DIASTOLIC BLOOD PRESSURE: 75 MMHG | SYSTOLIC BLOOD PRESSURE: 139 MMHG | DIASTOLIC BLOOD PRESSURE: 69 MMHG | SYSTOLIC BLOOD PRESSURE: 112 MMHG | RESPIRATION RATE: 23 BRPM | DIASTOLIC BLOOD PRESSURE: 77 MMHG | HEART RATE: 89 BPM | WEIGHT: 200.6 LBS | SYSTOLIC BLOOD PRESSURE: 118 MMHG | OXYGEN SATURATION: 96 % | SYSTOLIC BLOOD PRESSURE: 129 MMHG | HEART RATE: 82 BPM | OXYGEN SATURATION: 94 % | RESPIRATION RATE: 20 BRPM | HEART RATE: 90 BPM | OXYGEN SATURATION: 97 %

## 2022-09-30 DIAGNOSIS — K64.1 SECOND DEGREE HEMORRHOIDS: ICD-10-CM

## 2022-09-30 DIAGNOSIS — Z12.11 ENCOUNTER FOR SCREENING FOR MALIGNANT NEOPLASM OF COLON: ICD-10-CM

## 2022-09-30 PROCEDURE — 45378 DIAGNOSTIC COLONOSCOPY: CPT | Mod: 33 | Performed by: INTERNAL MEDICINE

## 2022-11-15 ENCOUNTER — TELEPHONE (OUTPATIENT)
Dept: FAMILY MEDICINE CLINIC | Facility: CLINIC | Age: 67
End: 2022-11-15

## 2022-11-15 DIAGNOSIS — Z13.220 SCREENING FOR LIPID DISORDERS: Primary | ICD-10-CM

## 2022-11-15 DIAGNOSIS — R51.9 INTRACTABLE HEADACHE, UNSPECIFIED CHRONICITY PATTERN, UNSPECIFIED HEADACHE TYPE: ICD-10-CM

## 2022-11-15 NOTE — TELEPHONE ENCOUNTER
Patient scheduled appt for PE on 1/30/2023 and a nurse appt for labs on 1/27/2022.  Patient would like to have fasting labs done prior to her appt but will needs orders.

## 2023-01-27 ENCOUNTER — CLINICAL SUPPORT (OUTPATIENT)
Dept: FAMILY MEDICINE CLINIC | Facility: CLINIC | Age: 68
End: 2023-01-27
Payer: COMMERCIAL

## 2023-01-27 DIAGNOSIS — R51.9 INTRACTABLE HEADACHE, UNSPECIFIED CHRONICITY PATTERN, UNSPECIFIED HEADACHE TYPE: ICD-10-CM

## 2023-01-27 DIAGNOSIS — Z13.220 SCREENING FOR LIPID DISORDERS: ICD-10-CM

## 2023-01-27 PROCEDURE — 36415 COLL VENOUS BLD VENIPUNCTURE: CPT | Performed by: FAMILY MEDICINE

## 2023-01-27 PROCEDURE — 80061 LIPID PANEL: CPT | Performed by: FAMILY MEDICINE

## 2023-01-27 PROCEDURE — 80053 COMPREHEN METABOLIC PANEL: CPT | Performed by: FAMILY MEDICINE

## 2023-01-27 NOTE — PROGRESS NOTES
Venipuncture performed in L ARM by RT Adonay  with good hemostasis. Patient tolerated well. 01/27/23 Amber Taylor, RT

## 2023-01-28 LAB
ALBUMIN SERPL-MCNC: 4.2 G/DL (ref 3.5–5.2)
ALBUMIN/GLOB SERPL: 1.9 G/DL
ALP SERPL-CCNC: 158 U/L (ref 39–117)
ALT SERPL W P-5'-P-CCNC: 21 U/L (ref 1–33)
ANION GAP SERPL CALCULATED.3IONS-SCNC: 6 MMOL/L (ref 5–15)
AST SERPL-CCNC: 19 U/L (ref 1–32)
BILIRUB SERPL-MCNC: 0.2 MG/DL (ref 0–1.2)
BUN SERPL-MCNC: 16 MG/DL (ref 8–23)
BUN/CREAT SERPL: 23.5 (ref 7–25)
CALCIUM SPEC-SCNC: 9.4 MG/DL (ref 8.6–10.5)
CHLORIDE SERPL-SCNC: 107 MMOL/L (ref 98–107)
CHOLEST SERPL-MCNC: 176 MG/DL (ref 0–200)
CO2 SERPL-SCNC: 25 MMOL/L (ref 22–29)
CREAT SERPL-MCNC: 0.68 MG/DL (ref 0.57–1)
EGFRCR SERPLBLD CKD-EPI 2021: 96.8 ML/MIN/1.73
GLOBULIN UR ELPH-MCNC: 2.2 GM/DL
GLUCOSE SERPL-MCNC: 104 MG/DL (ref 65–99)
HDLC SERPL-MCNC: 47 MG/DL (ref 40–60)
LDLC SERPL CALC-MCNC: 92 MG/DL (ref 0–100)
LDLC/HDLC SERPL: 1.82 {RATIO}
POTASSIUM SERPL-SCNC: 4.5 MMOL/L (ref 3.5–5.2)
PROT SERPL-MCNC: 6.4 G/DL (ref 6–8.5)
SODIUM SERPL-SCNC: 138 MMOL/L (ref 136–145)
TRIGL SERPL-MCNC: 217 MG/DL (ref 0–150)
VLDLC SERPL-MCNC: 37 MG/DL (ref 5–40)

## 2023-01-30 ENCOUNTER — OFFICE VISIT (OUTPATIENT)
Dept: FAMILY MEDICINE CLINIC | Facility: CLINIC | Age: 68
End: 2023-01-30
Payer: COMMERCIAL

## 2023-01-30 VITALS
DIASTOLIC BLOOD PRESSURE: 77 MMHG | OXYGEN SATURATION: 97 % | RESPIRATION RATE: 14 BRPM | HEART RATE: 82 BPM | BODY MASS INDEX: 37.21 KG/M2 | SYSTOLIC BLOOD PRESSURE: 114 MMHG | HEIGHT: 63 IN | TEMPERATURE: 98 F | WEIGHT: 210 LBS

## 2023-01-30 DIAGNOSIS — Z23 IMMUNIZATION DUE: ICD-10-CM

## 2023-01-30 DIAGNOSIS — Z00.00 ANNUAL PHYSICAL EXAM: Primary | ICD-10-CM

## 2023-01-30 DIAGNOSIS — R73.9 HYPERGLYCEMIA: ICD-10-CM

## 2023-01-30 DIAGNOSIS — Z78.0 POSTMENOPAUSAL: ICD-10-CM

## 2023-01-30 PROCEDURE — 90677 PCV20 VACCINE IM: CPT | Performed by: FAMILY MEDICINE

## 2023-01-30 PROCEDURE — 90471 IMMUNIZATION ADMIN: CPT | Performed by: FAMILY MEDICINE

## 2023-01-30 PROCEDURE — 99397 PER PM REEVAL EST PAT 65+ YR: CPT | Performed by: FAMILY MEDICINE

## 2023-01-30 RX ORDER — SEMAGLUTIDE 1.34 MG/ML
0.5 INJECTION, SOLUTION SUBCUTANEOUS WEEKLY
Qty: 1.5 ML | Refills: 0 | COMMUNITY
Start: 2023-01-30 | End: 2023-03-01 | Stop reason: SDUPTHER

## 2023-01-30 NOTE — PROGRESS NOTES
Subjective   Emily Drake is a 67 y.o. female.     Chief Complaint   Patient presents with   • Annual Exam     Physical   • Immunizations     Patient was given the Prevnar 20 while in the office today.          Current Outpatient Medications:   •  calcium carb-cholecalciferol 600-800 MG-UNIT tablet, Take 1 tablet by mouth Daily., Disp: , Rfl:   •  cholecalciferol (VITAMIN D3) 25 MCG (1000 UT) tablet, Take 2,000 Units by mouth Daily., Disp: , Rfl:   •  COLLAGEN PO, 1 po qd, Disp: , Rfl:   •  Multiple Vitamins-Minerals (CENTRUM SILVER 50+WOMEN) tablet, Daily., Disp: , Rfl:   •  Semaglutide,0.25 or 0.5MG/DOS, (Ozempic, 0.25 or 0.5 MG/DOSE,) 2 MG/1.5ML solution pen-injector, Inject 0.5 mg under the skin into the appropriate area as directed 1 (One) Time Per Week., Disp: 1.5 mL, Rfl: 0    Past Medical History:   Diagnosis Date   • MAMMO     NEG = 2021----Declines   • PAP     NEG = 2020       Past Surgical History:   Procedure Laterality Date   • COLONOSCOPY      NEG= 2013/ 2022,  Rech 2032                   [facility]   • DENTAL PROCEDURE      Dental surgery       Family History   Problem Relation Age of Onset   • Arthritis Mother    • Hypertension Mother    • Kidney disease Mother    • Hypertension Father    • Dementia Father    • Diabetes Father    • Rheum arthritis Sister    • Arthritis Sister         RA/ DDD L Spine   • Sjogren's syndrome Sister    • Other Sister         Autoimmune Dx like SLE   • Diabetes Brother    • Heart disease Brother         CABG x3   • Atrial fibrillation Brother    • Diabetes Other         1ST DEGREE RELATIVE       Social History     Socioeconomic History   • Marital status:    Tobacco Use   • Smoking status: Never   • Smokeless tobacco: Never   Vaping Use   • Vaping Use: Never used   Substance and Sexual Activity   • Alcohol use: Never   • Drug use: Never   • Sexual activity: Defer       History of Present Illness  68 y/o  C female here for annual PE w/ pap    Pt states she had her  colonoscopy and good x 10 yrs  Pt declines mammo but does do SBE regularly  Pt open to DEXA screening  Pt recently had fasting labs done as well    Pt having issues w/ her knees hurting and hasnt been able to walk for exercise.....NK       The following portions of the patient's history were reviewed and updated as appropriate: allergies, current medications, past family history, past medical history, past social history, past surgical history and problem list.    Review of Systems   Constitutional: Negative for activity change, appetite change, fatigue, unexpected weight gain and unexpected weight loss.   HENT: Negative for congestion, ear pain, hearing loss, nosebleeds, postnasal drip, rhinorrhea, sinus pressure, sneezing, sore throat, tinnitus and trouble swallowing.    Eyes: Negative for blurred vision and double vision.   Respiratory: Negative for cough and shortness of breath.    Cardiovascular: Negative for chest pain.   Gastrointestinal: Negative for abdominal pain, constipation, diarrhea, nausea, vomiting, GERD and indigestion.   Genitourinary: Negative for breast discharge, breast lump, breast pain, difficulty urinating, dysuria, flank pain, frequency, urgency and urinary incontinence.   Musculoskeletal: Positive for arthralgias and gait problem. Negative for myalgias.   Skin: Negative for rash and skin lesions.   Neurological: Negative for weakness, memory problem and confusion.   Psychiatric/Behavioral: Negative for agitation, self-injury, suicidal ideas, depressed mood and stress. The patient is not nervous/anxious.        Vitals:    01/30/23 1435   BP: 114/77   Pulse: 82   Resp: 14   Temp: 98 °F (36.7 °C)   SpO2: 97%       Objective   Physical Exam  Vitals and nursing note reviewed.   Constitutional:       General: She is not in acute distress.     Appearance: Normal appearance. She is well-developed. She is not ill-appearing or toxic-appearing.   HENT:      Head: Normocephalic and atraumatic.       Right Ear: Tympanic membrane, ear canal and external ear normal. There is no impacted cerumen.      Left Ear: Tympanic membrane, ear canal and external ear normal. There is no impacted cerumen.      Nose: Nose normal. No congestion or rhinorrhea.      Mouth/Throat:      Mouth: Mucous membranes are moist.      Pharynx: Oropharynx is clear. No oropharyngeal exudate or posterior oropharyngeal erythema.   Eyes:      Extraocular Movements: Extraocular movements intact.      Conjunctiva/sclera: Conjunctivae normal.      Pupils: Pupils are equal, round, and reactive to light.   Neck:      Thyroid: No thyromegaly.   Cardiovascular:      Rate and Rhythm: Normal rate and regular rhythm.      Pulses: Normal pulses.      Heart sounds: Normal heart sounds. No murmur heard.  Pulmonary:      Effort: Pulmonary effort is normal. No respiratory distress.      Breath sounds: Normal breath sounds. No stridor. No wheezing or rales.   Chest:   Breasts:     Right: Normal. No swelling, inverted nipple, mass, nipple discharge, skin change or tenderness.      Left: Normal. No swelling, inverted nipple, mass, nipple discharge, skin change or tenderness.   Abdominal:      General: Bowel sounds are normal. There is no distension.      Palpations: Abdomen is soft. There is no mass.      Tenderness: There is no abdominal tenderness. There is no guarding or rebound.      Hernia: No hernia is present. There is no hernia in the left inguinal area.   Genitourinary:     General: Normal vulva.      Exam position: Supine.      Labia:         Right: No rash or lesion.         Left: No rash or lesion.       Vagina: Normal. No foreign body. No vaginal discharge, erythema, tenderness, bleeding or lesions.      Cervix: No cervical motion tenderness, discharge, friability, lesion or eversion.      Uterus: Normal.       Adnexa:         Right: No mass, tenderness or fullness.          Left: No mass, tenderness or fullness.     Musculoskeletal:         General:  No tenderness. Normal range of motion.      Cervical back: Normal range of motion and neck supple.      Right lower leg: No edema.      Left lower leg: No edema.   Lymphadenopathy:      Cervical: No cervical adenopathy.      Upper Body:      Right upper body: No supraclavicular or axillary adenopathy.      Left upper body: No supraclavicular or axillary adenopathy.   Skin:     General: Skin is warm and dry.      Capillary Refill: Capillary refill takes less than 2 seconds.      Findings: No erythema, lesion or rash.   Neurological:      General: No focal deficit present.      Mental Status: She is alert and oriented to person, place, and time.      Cranial Nerves: No cranial nerve deficit.      Motor: No abnormal muscle tone.      Deep Tendon Reflexes: Reflexes normal.   Psychiatric:         Attention and Perception: Attention and perception normal.         Mood and Affect: Mood and affect normal.         Speech: Speech normal.         Behavior: Behavior normal. Behavior is cooperative.         Thought Content: Thought content normal.         Cognition and Memory: Cognition and memory normal.         Judgment: Judgment normal.           Assessment & Plan   Diagnoses and all orders for this visit:    1. Annual physical exam (Primary)  -     IGP,Aptima HPV,Age Gdln    2. Postmenopausal  -     DEXA Bone Density Axial; Future  -     IGP,Aptima HPV,Age Gdln    3. Immunization due  -     Pneumococcal Conjugate Vaccine 20-Valent All    4. Hyperglycemia    Other orders  -     Semaglutide,0.25 or 0.5MG/DOS, (Ozempic, 0.25 or 0.5 MG/DOSE,) 2 MG/1.5ML solution pen-injector; Inject 0.5 mg under the skin into the appropriate area as directed 1 (One) Time Per Week.  Dispense: 1.5 mL; Refill: 0    reviewed recent fasting labs w/ pt  Rx--DEXA  PCV 20 today  Disc'd vaccines for age    Trial of ozempic for BS/ wt control  Encouraged healthy diet and poss ortho consult INB

## 2023-02-05 LAB
AGE GDLN ACOG TESTING: NORMAL
CYTOLOGIST CVX/VAG CYTO: NORMAL
CYTOLOGY CVX/VAG DOC CYTO: NORMAL
CYTOLOGY CVX/VAG DOC THIN PREP: NORMAL
DX ICD CODE: NORMAL
HIV 1 & 2 AB SER-IMP: NORMAL
HPV GENOTYPE REFLEX: NORMAL
HPV I/H RISK 4 DNA CVX QL PROBE+SIG AMP: NORMAL
OTHER STN SPEC: NORMAL
STAT OF ADQ CVX/VAG CYTO-IMP: NORMAL

## 2023-03-01 RX ORDER — SEMAGLUTIDE 1.34 MG/ML
0.5 INJECTION, SOLUTION SUBCUTANEOUS WEEKLY
Qty: 4.5 ML | Refills: 1 | Status: SHIPPED | OUTPATIENT
Start: 2023-03-01

## 2023-04-07 ENCOUNTER — OFFICE VISIT (OUTPATIENT)
Dept: FAMILY MEDICINE CLINIC | Facility: CLINIC | Age: 68
End: 2023-04-07
Payer: COMMERCIAL

## 2023-04-07 VITALS
HEIGHT: 63 IN | DIASTOLIC BLOOD PRESSURE: 75 MMHG | TEMPERATURE: 98.4 F | HEART RATE: 94 BPM | SYSTOLIC BLOOD PRESSURE: 115 MMHG | WEIGHT: 210 LBS | OXYGEN SATURATION: 98 % | BODY MASS INDEX: 37.21 KG/M2

## 2023-04-07 DIAGNOSIS — R73.02 IGT (IMPAIRED GLUCOSE TOLERANCE): ICD-10-CM

## 2023-04-07 DIAGNOSIS — J32.0 MAXILLARY SINUSITIS, UNSPECIFIED CHRONICITY: Primary | ICD-10-CM

## 2023-04-07 PROCEDURE — 99213 OFFICE O/P EST LOW 20 MIN: CPT | Performed by: FAMILY MEDICINE

## 2023-04-07 RX ORDER — SULFAMETHOXAZOLE AND TRIMETHOPRIM 800; 160 MG/1; MG/1
1 TABLET ORAL 2 TIMES DAILY
Qty: 20 TABLET | Refills: 0 | Status: SHIPPED | OUTPATIENT
Start: 2023-04-07

## 2023-04-07 NOTE — PROGRESS NOTES
Subjective   Emily Drake is a 67 y.o. female.     Chief Complaint   Patient presents with   • Headache     Pt stated symptoms started 4 wks ago; cold symptoms   • Facial Pain   • Nasal Congestion     Greenish mucous   • Cough   • Night Sweats     The patient presents for persistent recurrent upper respiratory type symptoms.  She is accompanied by an adult male.    Last summer the patient had sinus congestion and was on two rounds of Augmentin. Her symptoms improved, but she was not well. She went to see an ENT and he did a culture and after he did the culture, he prescribed Bactrim and nasal rinses. She has a prescription for mupirocin but has not started it.  A CAT scan was ordered by the primary doctor at that time because she could not get over the horrible headaches. Her CT scan came back that she did have a sinus infection at that time. Her culture came back MRSA and her CAT scan showed acute and chronic maxillary sinusitis.     She declined a mammogram, and she is up to date on DEXA scan. She denies getting a yeast infection or thrush after taking the antibiotics.    She is taking Ozempic, and she is tolerating it. She has never had hemoglobin A1c. Her blood glucose has been running at 105 mg/dL.    She has been experiencing night sweats for a few nights and then intermittently since being sick.        Current Outpatient Medications:   •  calcium carb-cholecalciferol 600-800 MG-UNIT tablet, Take 1 tablet by mouth Daily., Disp: , Rfl:   •  cholecalciferol (VITAMIN D3) 25 MCG (1000 UT) tablet, Take 2 tablets by mouth Daily., Disp: , Rfl:   •  COLLAGEN PO, 1 po qd, Disp: , Rfl:   •  Multiple Vitamins-Minerals (CENTRUM SILVER 50+WOMEN) tablet, Daily., Disp: , Rfl:   •  Semaglutide,0.25 or 0.5MG/DOS, (Ozempic, 0.25 or 0.5 MG/DOSE,) 2 MG/1.5ML solution pen-injector, Inject 0.5 mg under the skin into the appropriate area as directed 1 (One) Time Per Week., Disp: 4.5 mL, Rfl: 1  •  sulfamethoxazole-trimethoprim  (Bactrim DS) 800-160 MG per tablet, Take 1 tablet by mouth 2 (Two) Times a Day., Disp: 20 tablet, Rfl: 0    Past Medical History:   Diagnosis Date   • DEXA     2023= (-0.7/-0.6)   • IGT    • MAMMO     NEG = 2021----Declines   • PAP     NEG = 2020/ 2023       Past Surgical History:   Procedure Laterality Date   • COLONOSCOPY      NEG= 2013/ 2022,  Rech 2032                   [facility]   • DENTAL PROCEDURE      Dental surgery       Family History   Problem Relation Age of Onset   • Arthritis Mother    • Hypertension Mother    • Kidney disease Mother    • Hypertension Father    • Dementia Father    • Diabetes Father    • Rheum arthritis Sister    • Arthritis Sister         RA/ DDD L Spine   • Sjogren's syndrome Sister    • Other Sister         Autoimmune Dx like SLE   • Diabetes Brother    • Heart disease Brother         CABG x3   • Atrial fibrillation Brother    • Diabetes Other         1ST DEGREE RELATIVE       Social History     Socioeconomic History   • Marital status:    Tobacco Use   • Smoking status: Never   • Smokeless tobacco: Never   Vaping Use   • Vaping Use: Never used   Substance and Sexual Activity   • Alcohol use: Never   • Drug use: Never   • Sexual activity: Defer       History of Present Illness  66 y/o C female here w/ c/o's URI symptoms x 2 weeks    Pt states she tried otc dayquil/ nyquil w/ some relief but conts w/ PND;          The following portions of the patient's history were reviewed and updated as appropriate: allergies, current medications, past family history, past medical history, past social history, past surgical history and problem list.    Review of Systems   Constitutional: Positive for diaphoresis. Negative for activity change, appetite change, chills, fatigue, fever, unexpected weight gain and unexpected weight loss.   HENT: Positive for congestion, postnasal drip and sinus pressure. Negative for ear discharge, ear pain, rhinorrhea, sneezing, sore throat and swollen glands.     Eyes: Negative for pain, discharge, redness, itching and visual disturbance.   Respiratory: Negative for cough, shortness of breath, wheezing and stridor.    Skin: Negative for rash.   Allergic/Immunologic: Negative for environmental allergies.   Psychiatric/Behavioral: Negative for sleep disturbance.       Vitals:    04/07/23 1009   BP: 115/75   Pulse: 94   Temp: 98.4 °F (36.9 °C)   SpO2: 98%       Objective   Physical Exam  Vitals and nursing note reviewed.   Constitutional:       General: She is not in acute distress.     Appearance: She is well-developed. She is not ill-appearing, toxic-appearing or diaphoretic.   HENT:      Head: Normocephalic and atraumatic.      Right Ear: Tympanic membrane, ear canal and external ear normal. There is no impacted cerumen.      Left Ear: Tympanic membrane, ear canal and external ear normal. There is no impacted cerumen.      Nose: Nose normal. No congestion or rhinorrhea.      Mouth/Throat:      Mouth: Mucous membranes are moist.      Pharynx: Oropharynx is clear. No oropharyngeal exudate or posterior oropharyngeal erythema.   Eyes:      General: No scleral icterus.        Right eye: No discharge.         Left eye: No discharge.      Extraocular Movements: Extraocular movements intact.      Conjunctiva/sclera: Conjunctivae normal.      Pupils: Pupils are equal, round, and reactive to light.   Neck:      Thyroid: No thyromegaly.   Cardiovascular:      Rate and Rhythm: Normal rate and regular rhythm.      Heart sounds: Normal heart sounds. No murmur heard.  Pulmonary:      Effort: Pulmonary effort is normal. No respiratory distress.      Breath sounds: Normal breath sounds. No stridor. No wheezing, rhonchi or rales.   Musculoskeletal:      Cervical back: Normal range of motion and neck supple.   Lymphadenopathy:      Cervical: No cervical adenopathy.   Skin:     General: Skin is warm and dry.      Coloration: Skin is not pale.      Findings: No erythema or rash.    Neurological:      Mental Status: She is alert and oriented to person, place, and time.      Cranial Nerves: No cranial nerve deficit.   Psychiatric:         Attention and Perception: Attention normal.         Mood and Affect: Mood and affect normal.         Speech: Speech normal.         Behavior: Behavior normal. Behavior is cooperative.         Thought Content: Thought content normal.         Cognition and Memory: Cognition and memory normal.         Judgment: Judgment normal.           Assessment & Plan   Diagnoses and all orders for this visit:    1. Maxillary sinusitis, unspecified chronicity (Primary)    2. IGT (impaired glucose tolerance)    Other orders  -     sulfamethoxazole-trimethoprim (Bactrim DS) 800-160 MG per tablet; Take 1 tablet by mouth 2 (Two) Times a Day.  Dispense: 20 tablet; Refill: 0    Rx---Bactrim  Cont Ozempic    Transcribed from ambient dictation for Maria Teresa Cantor DO by Meri Gunderson.  04/07/23   13:25 EDT    Patient or patient representative verbalized consent to the visit recording.  I have personally performed the services described in this document as transcribed by the above individual, and it is both accurate and complete.

## 2023-05-18 RX ORDER — SEMAGLUTIDE 1.34 MG/ML
INJECTION, SOLUTION SUBCUTANEOUS
Qty: 4.5 ML | Refills: 1 | Status: SHIPPED | OUTPATIENT
Start: 2023-05-18

## 2023-05-18 NOTE — TELEPHONE ENCOUNTER
Rx Refill Note  Requested Prescriptions     Pending Prescriptions Disp Refills   • Ozempic, 0.25 or 0.5 MG/DOSE, 2 MG/1.5ML solution pen-injector [Pharmacy Med Name: OZEMPIC 0.25-0.5 MG/DOSE PEN] 4.5 mL 1     Sig: DIAL AND INJECT UNDER THE SKIN 0.5 MG WEEKLY      Last office visit with prescribing clinician: 4/7/2023   Last telemedicine visit with prescribing clinician: 4/7/2023   Next office visit with prescribing clinician: Visit date not found                         Would you like a call back once the refill request has been completed: [] Yes [] No    If the office needs to give you a call back, can they leave a voicemail: [] Yes [] No    Amber Taylor, RT  05/18/23, 15:38 EDT

## 2023-07-27 ENCOUNTER — TRANSCRIBE ORDERS (OUTPATIENT)
Dept: CARDIOLOGY | Facility: HOSPITAL | Age: 68
End: 2023-07-27
Payer: COMMERCIAL

## 2023-07-27 DIAGNOSIS — Z13.9 SCREENING DUE: Primary | ICD-10-CM

## 2023-10-13 ENCOUNTER — PRIOR AUTHORIZATION (OUTPATIENT)
Dept: FAMILY MEDICINE CLINIC | Facility: CLINIC | Age: 68
End: 2023-10-13
Payer: COMMERCIAL

## 2023-10-13 RX ORDER — TIRZEPATIDE 5 MG/.5ML
5 INJECTION, SOLUTION SUBCUTANEOUS
Qty: 6 ML | Refills: 0 | Status: SHIPPED | OUTPATIENT
Start: 2023-10-13

## 2023-10-13 NOTE — TELEPHONE ENCOUNTER
HUB to read    PA was sent for Mounjaro 5MG/0.5ML pen-injectors    Waiting on the outcome from insurance.

## 2023-10-13 NOTE — TELEPHONE ENCOUNTER
HUB to read    PA approved for Mounjaro 5MG/0.5ML pen-injectors      Your PA request has been approved. Additional information will be provided in the approval communication. (Message 1145)    This is to inform you that your Prior Authorization request for the above member's Mounjaro  5MG/0.5ML SC SOPN has been approved. If you are changing the member's therapy the previously  approved therapy will be canceled and replaced.  The authorization is valid from 09/13/2023 through 10/12/2024. A letter of explanation will also be  mailed to the patient.  Approvals may be subject to dosing limits in accordance with FDA approved labeling, accepted  compendia, evidence-based practice guidelines or your prescription drug plan benefits. You can go to  [email or web address], scroll down and select downloaded here, and find the  medication to see if there are dosing limits

## 2023-12-18 DIAGNOSIS — R73.02 IGT (IMPAIRED GLUCOSE TOLERANCE): Primary | ICD-10-CM

## 2023-12-18 DIAGNOSIS — Z13.220 SCREENING FOR LIPID DISORDERS: ICD-10-CM

## 2023-12-18 NOTE — TELEPHONE ENCOUNTER
Rx Refill Note  Requested Prescriptions     Pending Prescriptions Disp Refills    Mounjaro 5 MG/0.5ML solution pen-injector [Pharmacy Med Name: MOUNJARO 5 MG/0.5 ML PEN] 6 mL 0     Sig: INJECT 5 MG UNDER THE SKIN ONCE WEEKLY      Last office visit with prescribing clinician: 4/7/2023   Last telemedicine visit with prescribing clinician: Visit date not found   Next office visit with prescribing clinician: Visit date not found                         Would you like a call back once the refill request has been completed: [] Yes [] No    If the office needs to give you a call back, can they leave a voicemail: [] Yes [] No    Amber Taylor, RT  12/18/23, 15:18 EST

## 2023-12-19 RX ORDER — TIRZEPATIDE 5 MG/.5ML
INJECTION, SOLUTION SUBCUTANEOUS
Qty: 6 ML | Refills: 0 | OUTPATIENT
Start: 2023-12-19

## 2023-12-19 NOTE — TELEPHONE ENCOUNTER
Caller: Emily Drake    Relationship: Self    Best call back number: [phone]     Requested Prescriptions:   Requested Prescriptions     Pending Prescriptions Disp Refills    Tirzepatide (Mounjaro) 7.5 MG/0.5ML solution pen-injector 6 mL 0     Sig: Inject 0.5 mL under the skin into the appropriate area as directed Every 7 (Seven) Days.        Pharmacy where request should be sent: [facility] [id number] - [phone]  - [phone]     Last office visit with prescribing clinician: 4/7/2023   Last telemedicine visit with prescribing clinician: Visit date not found   Next office visit with prescribing clinician: 1/26/2024     Additional details provided by patient: MEDICATION NEEDS A DIAGNOSIS CODE    Does the patient have less than a 3 day supply:  [] Yes  [] No    Would you like a call back once the refill request has been completed: [] Yes [] No    If the office needs to give you a call back, can they leave a voicemail: [] Yes [] No    Juju Emmanuel Rep   12/19/23 12:57 EST

## 2023-12-22 RX ORDER — TIRZEPATIDE 5 MG/.5ML
INJECTION, SOLUTION SUBCUTANEOUS
Qty: 6 ML | Refills: 0 | OUTPATIENT
Start: 2023-12-22

## 2024-01-15 ENCOUNTER — CLINICAL SUPPORT (OUTPATIENT)
Dept: FAMILY MEDICINE CLINIC | Facility: CLINIC | Age: 69
End: 2024-01-15
Payer: COMMERCIAL

## 2024-01-15 DIAGNOSIS — R73.02 IGT (IMPAIRED GLUCOSE TOLERANCE): ICD-10-CM

## 2024-01-15 DIAGNOSIS — Z13.220 SCREENING FOR LIPID DISORDERS: ICD-10-CM

## 2024-01-15 LAB
ALBUMIN SERPL-MCNC: 4.4 G/DL (ref 3.5–5.2)
ALBUMIN/GLOB SERPL: 1.7 G/DL
ALP SERPL-CCNC: 118 U/L (ref 39–117)
ALT SERPL W P-5'-P-CCNC: 18 U/L (ref 1–33)
ANION GAP SERPL CALCULATED.3IONS-SCNC: 12.9 MMOL/L (ref 5–15)
AST SERPL-CCNC: 26 U/L (ref 1–32)
BILIRUB SERPL-MCNC: 0.3 MG/DL (ref 0–1.2)
BUN SERPL-MCNC: 13 MG/DL (ref 8–23)
BUN/CREAT SERPL: 14.1 (ref 7–25)
CALCIUM SPEC-SCNC: 10 MG/DL (ref 8.6–10.5)
CHLORIDE SERPL-SCNC: 103 MMOL/L (ref 98–107)
CHOLEST SERPL-MCNC: 168 MG/DL (ref 0–200)
CO2 SERPL-SCNC: 21.1 MMOL/L (ref 22–29)
CREAT SERPL-MCNC: 0.92 MG/DL (ref 0.57–1)
EGFRCR SERPLBLD CKD-EPI 2021: 68.8 ML/MIN/1.73
GLOBULIN UR ELPH-MCNC: 2.6 GM/DL
GLUCOSE SERPL-MCNC: 94 MG/DL (ref 65–99)
HDLC SERPL-MCNC: 48 MG/DL (ref 40–60)
LDLC SERPL CALC-MCNC: 102 MG/DL (ref 0–100)
LDLC/HDLC SERPL: 2.1 {RATIO}
POTASSIUM SERPL-SCNC: 4.5 MMOL/L (ref 3.5–5.2)
PROT SERPL-MCNC: 7 G/DL (ref 6–8.5)
SODIUM SERPL-SCNC: 137 MMOL/L (ref 136–145)
TRIGL SERPL-MCNC: 96 MG/DL (ref 0–150)
VLDLC SERPL-MCNC: 18 MG/DL (ref 5–40)

## 2024-01-15 PROCEDURE — 36415 COLL VENOUS BLD VENIPUNCTURE: CPT | Performed by: FAMILY MEDICINE

## 2024-01-15 PROCEDURE — 80061 LIPID PANEL: CPT | Performed by: FAMILY MEDICINE

## 2024-01-15 PROCEDURE — 80053 COMPREHEN METABOLIC PANEL: CPT | Performed by: FAMILY MEDICINE

## 2024-01-15 NOTE — PROGRESS NOTES
Venipuncture performed in L ARM by RT Adonay  with good hemostasis. Patient tolerated well. 01/15/24 Amber Taylor, RT

## 2024-01-26 ENCOUNTER — OFFICE VISIT (OUTPATIENT)
Dept: FAMILY MEDICINE CLINIC | Facility: CLINIC | Age: 69
End: 2024-01-26
Payer: COMMERCIAL

## 2024-01-26 VITALS
SYSTOLIC BLOOD PRESSURE: 103 MMHG | HEIGHT: 63 IN | OXYGEN SATURATION: 96 % | RESPIRATION RATE: 14 BRPM | TEMPERATURE: 98.4 F | BODY MASS INDEX: 33.49 KG/M2 | HEART RATE: 80 BPM | WEIGHT: 189 LBS | DIASTOLIC BLOOD PRESSURE: 64 MMHG

## 2024-01-26 DIAGNOSIS — M79.605 LEG PAIN, LATERAL, LEFT: Primary | ICD-10-CM

## 2024-01-26 DIAGNOSIS — Z23 IMMUNIZATION DUE: ICD-10-CM

## 2024-01-26 DIAGNOSIS — E66.09 CLASS 1 OBESITY DUE TO EXCESS CALORIES WITH SERIOUS COMORBIDITY AND BODY MASS INDEX (BMI) OF 33.0 TO 33.9 IN ADULT: ICD-10-CM

## 2024-01-26 DIAGNOSIS — R73.02 IGT (IMPAIRED GLUCOSE TOLERANCE): ICD-10-CM

## 2024-01-26 PROCEDURE — 99214 OFFICE O/P EST MOD 30 MIN: CPT | Performed by: FAMILY MEDICINE

## 2024-01-26 PROCEDURE — 90750 HZV VACC RECOMBINANT IM: CPT | Performed by: FAMILY MEDICINE

## 2024-01-26 NOTE — PROGRESS NOTES
Subjective   Emily Drake is a 68 y.o. female.     Chief Complaint   Patient presents with    Obesity    Injections     Patient was given the Shingrix #1 injection while in the office today.         Current Outpatient Medications:     calcium carb-cholecalciferol 600-800 MG-UNIT tablet, Take 1 tablet by mouth Daily., Disp: , Rfl:     cholecalciferol (VITAMIN D3) 25 MCG (1000 UT) tablet, Take 1 tablet by mouth Daily., Disp: , Rfl:     COLLAGEN PO, 1 po qd, Disp: , Rfl:     Multiple Vitamins-Minerals (CENTRUM SILVER 50+WOMEN) tablet, Daily., Disp: , Rfl:     Tirzepatide (Mounjaro) 7.5 MG/0.5ML solution pen-injector, Inject 0.5 mL under the skin into the appropriate area as directed Every 7 (Seven) Days., Disp: 6 mL, Rfl: 0    Past Medical History:   Diagnosis Date    DEXA     2023= (-0.7/-0.6)    IGT     MAMMO     NEG = 2021----Declines    PAP     NEG = 2020/ 2023       Past Surgical History:   Procedure Laterality Date    COLONOSCOPY      NEG= 2013/ 2022,  Rech 2032                   GSI    DENTAL PROCEDURE      Dental surgery       Family History   Problem Relation Age of Onset    Arthritis Mother     Hypertension Mother     Kidney disease Mother     Hypertension Father     Dementia Father     Diabetes Father     Rheum arthritis Sister     Arthritis Sister         RA/ DDD L Spine    Sjogren's syndrome Sister     Other Sister         Autoimmune Dx like SLE    Diabetes Brother     Heart disease Brother         CABG x3    Atrial fibrillation Brother     Diabetes Other         1ST DEGREE RELATIVE       Social History     Socioeconomic History    Marital status:    Tobacco Use    Smoking status: Never     Passive exposure: Never    Smokeless tobacco: Never   Vaping Use    Vaping Use: Never used   Substance and Sexual Activity    Alcohol use: Never    Drug use: Never    Sexual activity: Defer     Obesity  Associated symptoms include myalgias (Left calf pain  x 2 weeks). Pertinent negatives include no arthralgias,  rash or weakness.       The patient is a 68-year-old female who is here for follow-up IGT/ wt loss    She has impaired glucose tolerance. She is not interested in getting a mammogram. She has had 1 Pap smear.  She is on calcium, vitamin D once a day, extra vitamin D 1000, collagen, vitamin, and Mounjaro 7.5.    She has had left calf pain for a few weeks. It was intermittent, but now it is constant. She sits a lot w/ her job. She recently got rid of a recliner that was hard to open and close (she had to kick it back). She did not see any bruise. She does not think it is swollen. She has not tried to massage it. She denies any varicose veins. She denies any rash. It is  a constant pain-----It does not burn or throb. It does not radiate. Walking does not make it any worse. She does not have compression hose. She does not exercise as much as she should because her  has been having a difficult yr is scheduled to have open heart surgery on Tuesday. She works in [place] at the [employer]. She works 10-hour days 4 days a week. She does sit a lot at her job. She has tried stretching her leg. She has not tried ice.    She has received 3 COVID-19 vaccines. She has not received the booster this past year. She received her influenza vaccine in 10/2023.     The following portions of the patient's history were reviewed and updated as appropriate: allergies, current medications, past family history, past medical history, past social history, past surgical history and problem list.    Review of Systems   Constitutional:  Positive for activity change and unexpected weight loss. Negative for appetite change and unexpected weight gain.   Musculoskeletal:  Positive for myalgias (Left calf pain  x 2 weeks). Negative for arthralgias.   Skin:  Negative for dry skin, rash and bruise.   Neurological:  Negative for weakness.   Psychiatric/Behavioral:  Positive for stress.        Vitals:    01/26/24 0954   BP: 103/64   Pulse: 80   Resp: 14    Temp: 98.4 °F (36.9 °C)   SpO2: 96%     BMI=33.49  Objective   Physical Exam  Vitals and nursing note reviewed.   Constitutional:       General: She is not in acute distress.     Appearance: She is not ill-appearing or toxic-appearing.   HENT:      Head: Normocephalic and atraumatic.   Cardiovascular:      Rate and Rhythm: Normal rate and regular rhythm.      Heart sounds: No murmur heard.  Pulmonary:      Effort: Pulmonary effort is normal. No respiratory distress.      Breath sounds: No stridor. No wheezing, rhonchi or rales.   Musculoskeletal:      Right lower leg: No swelling, deformity, lacerations or tenderness. No edema.      Left lower leg: Tenderness present. No swelling, deformity or lacerations. No edema.        Legs:       Comments: +mild TTP at post-lateral aspect of prox distal Lt LE----no visible ecchymosis/ erythema/ palpable mass or varicosities   Skin:     General: Skin is warm and dry.      Findings: No bruising, ecchymosis, erythema or rash.   Neurological:      Mental Status: She is alert and oriented to person, place, and time.      Cranial Nerves: No cranial nerve deficit.   Psychiatric:         Mood and Affect: Mood normal.         Behavior: Behavior normal.         Thought Content: Thought content normal.         Judgment: Judgment normal.       Assessment & Plan   Diagnoses and all orders for this visit:    1. Leg pain, lateral, left (Primary)    2. IGT (impaired glucose tolerance)    3. Class 1 obesity due to excess calories with serious comorbidity and body mass index (BMI) of 33.0 to 33.9 in adult    4. Immunization due  -     Shingrix Vaccine      -She was recommended to do weightbearing exercises like running, walking, and lifting weights for her spine. She was advised to take vitamin D and calcium intake in her food or supplement.    - She is up-to-date on her Pap smear and colon cancer screening. She was advised to get the shingles vaccine at the pharmacy.    -Pt to start sample  Mounjaro and will refill w/ zepbound or wegovy if tolerated  - Encouraged pt to walk for exercise and make healthy choices    4. Left calf pain.  -  She was advised to ice it and stretch it. If it does not go away, we might have to consider getting a CT soft tissue scan to look at the soft tissue of the lower extremity.    Pt to call when needing FMLA paperwork filled out for her 's recovery             Transcribed from ambient dictation for Maria Teresa Cantor DO by Meri Gunderson.  01/26/24   11:13 EST    Patient or patient representative verbalized consent to the visit recording.  I have personally performed the services described in this document as transcribed by the above individual, and it is both accurate and complete.

## 2024-02-21 DIAGNOSIS — Z13.6 ENCOUNTER FOR SCREENING FOR VASCULAR DISEASE: Primary | ICD-10-CM

## 2024-02-23 ENCOUNTER — HOSPITAL ENCOUNTER (OUTPATIENT)
Dept: CARDIOLOGY | Facility: HOSPITAL | Age: 69
Discharge: HOME | End: 2024-02-23

## 2024-02-23 ENCOUNTER — HOSPITAL ENCOUNTER (OUTPATIENT)
Dept: CT IMAGING | Facility: HOSPITAL | Age: 69
Discharge: HOME | End: 2024-02-23

## 2024-02-23 DIAGNOSIS — Z13.9 SCREENING DUE: ICD-10-CM

## 2024-02-23 LAB
BH CV VAS SCREENING CAROTID CCA LEFT: 89 CM/SEC
BH CV VAS SCREENING CAROTID CCA RIGHT: 71 CM/SEC
BH CV VAS SCREENING CAROTID ICA LEFT: 73 CM/SEC
BH CV VAS SCREENING CAROTID ICA RIGHT: 86 CM/SEC
BH CV XLRA MEAS - MID AO DIAM: 2 CM
BH CV XLRA MEAS - PAD LEFT ABI PT: 1.2
BH CV XLRA MEAS - PAD LEFT ARM: 110 MMHG
BH CV XLRA MEAS - PAD LEFT LEG PT: 132 MMHG
BH CV XLRA MEAS - PAD RIGHT ABI PT: 1.21
BH CV XLRA MEAS - PAD RIGHT ARM: 109 MMHG
BH CV XLRA MEAS - PAD RIGHT LEG PT: 133 MMHG
BH CV XLRA MEAS LEFT DIST CCA EDV: -27 CM/SEC
BH CV XLRA MEAS LEFT DIST CCA PSV: -89 CM/SEC
BH CV XLRA MEAS LEFT ICA/CCA RATIO: 0.8
BH CV XLRA MEAS LEFT PROX ICA EDV: -22.6 CM/SEC
BH CV XLRA MEAS LEFT PROX ICA PSV: -72.7 CM/SEC
BH CV XLRA MEAS RIGHT DIST CCA EDV: 23.1 CM/SEC
BH CV XLRA MEAS RIGHT DIST CCA PSV: 70.8 CM/SEC
BH CV XLRA MEAS RIGHT ICA/CCA RATIO: 1.2
BH CV XLRA MEAS RIGHT PROX ICA EDV: -19.7 CM/SEC
BH CV XLRA MEAS RIGHT PROX ICA PSV: -86 CM/SEC

## 2024-02-23 PROCEDURE — 75571 CT HRT W/O DYE W/CA TEST: CPT

## 2024-02-23 PROCEDURE — 93799 UNLISTED CV SVC/PROCEDURE: CPT

## 2024-03-12 ENCOUNTER — PRIOR AUTHORIZATION (OUTPATIENT)
Dept: FAMILY MEDICINE CLINIC | Facility: CLINIC | Age: 69
End: 2024-03-12
Payer: COMMERCIAL

## 2024-03-12 NOTE — TELEPHONE ENCOUNTER
HUB to read    PA was sent for Mounjaro 7.5MG/0.5ML pen-injectors    Waiting on the outcome from insurance.

## 2024-05-20 ENCOUNTER — CLINICAL SUPPORT (OUTPATIENT)
Dept: FAMILY MEDICINE CLINIC | Facility: CLINIC | Age: 69
End: 2024-05-20
Payer: COMMERCIAL

## 2024-05-20 DIAGNOSIS — Z23 IMMUNIZATION DUE: Primary | ICD-10-CM

## 2024-05-20 PROCEDURE — 90750 HZV VACC RECOMBINANT IM: CPT | Performed by: NURSE PRACTITIONER

## 2024-09-05 NOTE — TELEPHONE ENCOUNTER
Rx Refill Note  Requested Prescriptions     Pending Prescriptions Disp Refills    Mounjaro 12.5 MG/0.5ML solution pen-injector pen [Pharmacy Med Name: MOUNJARO 12.5 MG/0.5 ML PEN] 6 mL 0     Sig: INJECT 12.5 MG UNDER THE SKIN ONCE WEEKLY      Last office visit with prescribing clinician: 1/26/2024   Last telemedicine visit with prescribing clinician: Visit date not found   Next office visit with prescribing clinician: 1/10/2025        Lipid Panel (01/15/2024 09:48)                  Would you like a call back once the refill request has been completed: [] Yes [] No    If the office needs to give you a call back, can they leave a voicemail: [] Yes [] No    Amber Taylor, RT  09/05/24, 08:35 EDT

## 2024-09-06 RX ORDER — TIRZEPATIDE 12.5 MG/.5ML
INJECTION, SOLUTION SUBCUTANEOUS
Qty: 6 ML | Refills: 0 | Status: SHIPPED | OUTPATIENT
Start: 2024-09-06

## 2024-09-10 ENCOUNTER — PATIENT MESSAGE (OUTPATIENT)
Dept: FAMILY MEDICINE CLINIC | Facility: CLINIC | Age: 69
End: 2024-09-10
Payer: COMMERCIAL

## 2024-09-11 ENCOUNTER — PRIOR AUTHORIZATION (OUTPATIENT)
Dept: FAMILY MEDICINE CLINIC | Facility: CLINIC | Age: 69
End: 2024-09-11
Payer: COMMERCIAL

## 2024-09-11 NOTE — TELEPHONE ENCOUNTER
HUB to read    PA was sent for Mounjaro 12.5MG/0.5ML pen-injectors    Waiting on the outcome from insurance.

## 2024-09-13 NOTE — TELEPHONE ENCOUNTER
HUB to read    PA approved for Mounjaro 12.5MG/0.5ML pen-injectors    PA approval was faxed to the patients pharmacy    PA was approved from 8/13/24-9/12/25

## 2024-10-03 ENCOUNTER — TELEPHONE (OUTPATIENT)
Dept: FAMILY MEDICINE CLINIC | Facility: CLINIC | Age: 69
End: 2024-10-03
Payer: COMMERCIAL

## 2024-10-03 NOTE — TELEPHONE ENCOUNTER
Spoke with Emily regarding when last mammogram was performed. Confirmed last mammogram was 2021. Declined to schedule mammogram at this time.

## 2024-10-14 ENCOUNTER — PRIOR AUTHORIZATION (OUTPATIENT)
Dept: FAMILY MEDICINE CLINIC | Facility: CLINIC | Age: 69
End: 2024-10-14
Payer: COMMERCIAL

## 2024-10-14 NOTE — TELEPHONE ENCOUNTER
HUB to read    PA was sent for Mounjaro 5MG/0.5ML auto-injectors    Waiting on the outcome from insurance.

## 2024-10-15 NOTE — TELEPHONE ENCOUNTER
HUB to read    PA approved for Mounjaro 5MG/0.5ML auto-injectors    PA approval was faxed to the patients pharmacy       Outcome  Additional Information Required  Your PA has been resolved, no additional PA is required. For further inquiries please contact the number on the back of the member prescription card. (Message [phone])

## 2024-12-27 ENCOUNTER — TELEPHONE (OUTPATIENT)
Dept: FAMILY MEDICINE CLINIC | Facility: CLINIC | Age: 69
End: 2024-12-27

## 2024-12-27 RX ORDER — COVID-19 ANTIGEN TEST
1 KIT MISCELLANEOUS AS NEEDED
Qty: 4 KIT | Refills: 0 | Status: SHIPPED | OUTPATIENT
Start: 2024-12-27

## 2024-12-27 NOTE — TELEPHONE ENCOUNTER
Spoke with  - patient tested positive yesterday for covid.  Wants to know if she can have medicine sent in.  ALSO  having symptoms- tested negative, but wants to know if there is something he can take since he is exposed. (See pt husb chart also)

## 2024-12-27 NOTE — TELEPHONE ENCOUNTER
Caller: Emily Drake    Relationship: Self    Best call back number: [phone]    What medication are you requesting: COVID TESTS    What are your current symptoms: NASAL CONGESTION NO TASTE SMELL WITH HEADACHES    How long have you been experiencing symptoms: S DAYS    Have you had these symptoms before:    [x] Yes  [] No    Have you been treated for these symptoms before:   [x] Yes  [] No    If a prescription is needed, what is your preferred pharmacy and phone number: [facility] [id number] - [phone]  - [phone]      Additional notes:    PLEASE CALL TO CONFIRM OR DENY

## 2024-12-27 NOTE — TELEPHONE ENCOUNTER
Caller: Emily Drake    Relationship: Self    Best call back number: [phone]    What medication are you requesting: COVID POSITIVE 12/27/24    What are your current symptoms: LOSS OF TASTE, CONGESTION, NASAL, LOW GRADE FEVER, INTERMITTENT COUGH ,HEADACHE, CAN'T HEAR VERY WELL     How long have you been experiencing symptoms:     Have you had these symptoms before:    [x] Yes  [] No    Have you been treated for these symptoms before:   [x] Yes  [] No    If a prescription is needed, what is your preferred pharmacy and phone number: [facility] [id number] [place], [place] - [address] [place], [place] [address] - [phone]  - [phone]     Additional notes:

## 2024-12-30 ENCOUNTER — TELEPHONE (OUTPATIENT)
Dept: FAMILY MEDICINE CLINIC | Facility: CLINIC | Age: 69
End: 2024-12-30

## 2024-12-30 DIAGNOSIS — Z13.220 SCREENING FOR LIPID DISORDERS: ICD-10-CM

## 2024-12-30 DIAGNOSIS — Z78.0 POSTMENOPAUSAL: Primary | ICD-10-CM

## 2024-12-30 DIAGNOSIS — R73.02 IGT (IMPAIRED GLUCOSE TOLERANCE): Primary | ICD-10-CM

## 2024-12-30 NOTE — TELEPHONE ENCOUNTER
Caller: Emily Drake    Relationship: Self    Best call back number: [phone]    What was the call regarding: UPDATE ON PATIENT. PATIENT CALLING WANTING TO UPDATE DR. VALDEZ ON HER CONDITION. THE PATIENT STATES  SHE IS FEELING BETTER AFTER TAKING THE PAXLOVID.

## 2024-12-30 NOTE — TELEPHONE ENCOUNTER
Caller: Emily Drake    Relationship: Self    Best call back number: [phone]    What orders are you requesting (i.e. lab or imaging): ROUTINE LABS, CHECK VITAMIN D LEVEL    BONE DENSITY SCAN    In what timeframe would the patient need to come in: LABS PRIOR TO PHYSICAL APPOINTMENT SCHEDULED FOR 2/21/25.     Where will you receive your lab/imaging services: LABS IN OFFICE. BONE DENSITY, NO PREFERENCE    Additional notes: PATIENT REQUEST A CALL BACK TO SCHEDULE LAB APPOINTMENT

## 2025-02-10 ENCOUNTER — CLINICAL SUPPORT (OUTPATIENT)
Dept: FAMILY MEDICINE CLINIC | Facility: CLINIC | Age: 70
End: 2025-02-10
Payer: COMMERCIAL

## 2025-02-10 DIAGNOSIS — Z13.220 SCREENING FOR LIPID DISORDERS: ICD-10-CM

## 2025-02-10 DIAGNOSIS — R73.02 IGT (IMPAIRED GLUCOSE TOLERANCE): ICD-10-CM

## 2025-02-10 PROCEDURE — 36415 COLL VENOUS BLD VENIPUNCTURE: CPT | Performed by: FAMILY MEDICINE

## 2025-02-10 PROCEDURE — 80053 COMPREHEN METABOLIC PANEL: CPT | Performed by: FAMILY MEDICINE

## 2025-02-10 PROCEDURE — 80061 LIPID PANEL: CPT | Performed by: FAMILY MEDICINE

## 2025-02-10 PROCEDURE — 82306 VITAMIN D 25 HYDROXY: CPT | Performed by: FAMILY MEDICINE

## 2025-02-10 NOTE — PROGRESS NOTES
Venipuncture performed in L ARM by RT Adonay  with good hemostasis. Patient tolerated well. 02/10/25 Amber Taylor, RT

## 2025-02-11 LAB
25(OH)D3 SERPL-MCNC: 47.6 NG/ML (ref 30–100)
ALBUMIN SERPL-MCNC: 4.2 G/DL (ref 3.5–5.2)
ALBUMIN/GLOB SERPL: 1.6 G/DL
ALP SERPL-CCNC: 125 U/L (ref 39–117)
ALT SERPL W P-5'-P-CCNC: 15 U/L (ref 1–33)
ANION GAP SERPL CALCULATED.3IONS-SCNC: 5.6 MMOL/L (ref 5–15)
AST SERPL-CCNC: 25 U/L (ref 1–32)
BILIRUB SERPL-MCNC: 0.3 MG/DL (ref 0–1.2)
BUN SERPL-MCNC: 13 MG/DL (ref 8–23)
BUN/CREAT SERPL: 17.3 (ref 7–25)
CALCIUM SPEC-SCNC: 9.4 MG/DL (ref 8.6–10.5)
CHLORIDE SERPL-SCNC: 108 MMOL/L (ref 98–107)
CHOLEST SERPL-MCNC: 170 MG/DL (ref 0–200)
CO2 SERPL-SCNC: 25.4 MMOL/L (ref 22–29)
CREAT SERPL-MCNC: 0.75 MG/DL (ref 0.57–1)
EGFRCR SERPLBLD CKD-EPI 2021: 87.4 ML/MIN/1.73
GLOBULIN UR ELPH-MCNC: 2.6 GM/DL
GLUCOSE SERPL-MCNC: 95 MG/DL (ref 65–99)
HDLC SERPL-MCNC: 53 MG/DL (ref 40–60)
LDLC SERPL CALC-MCNC: 97 MG/DL (ref 0–100)
LDLC/HDLC SERPL: 1.79 {RATIO}
POTASSIUM SERPL-SCNC: 4.3 MMOL/L (ref 3.5–5.2)
PROT SERPL-MCNC: 6.8 G/DL (ref 6–8.5)
SODIUM SERPL-SCNC: 139 MMOL/L (ref 136–145)
TRIGL SERPL-MCNC: 110 MG/DL (ref 0–150)
VLDLC SERPL-MCNC: 20 MG/DL (ref 5–40)

## 2025-02-21 ENCOUNTER — OFFICE VISIT (OUTPATIENT)
Dept: FAMILY MEDICINE CLINIC | Facility: CLINIC | Age: 70
End: 2025-02-21
Payer: COMMERCIAL

## 2025-02-21 VITALS
RESPIRATION RATE: 16 BRPM | TEMPERATURE: 98 F | HEART RATE: 76 BPM | OXYGEN SATURATION: 98 % | SYSTOLIC BLOOD PRESSURE: 99 MMHG | WEIGHT: 168 LBS | HEIGHT: 63 IN | DIASTOLIC BLOOD PRESSURE: 56 MMHG | BODY MASS INDEX: 29.77 KG/M2

## 2025-02-21 DIAGNOSIS — M85.89 OSTEOPENIA OF MULTIPLE SITES: ICD-10-CM

## 2025-02-21 DIAGNOSIS — R73.02 IGT (IMPAIRED GLUCOSE TOLERANCE): ICD-10-CM

## 2025-02-21 DIAGNOSIS — L84 PRE-ULCERATIVE CORN OR CALLOUS: ICD-10-CM

## 2025-02-21 DIAGNOSIS — M21.619 BUNION: ICD-10-CM

## 2025-02-21 DIAGNOSIS — Z00.00 ANNUAL PHYSICAL EXAM: Primary | ICD-10-CM

## 2025-02-21 LAB
BILIRUB BLD-MCNC: NEGATIVE MG/DL
CLARITY, POC: CLEAR
COLOR UR: YELLOW
EXPIRATION DATE: ABNORMAL
GLUCOSE UR STRIP-MCNC: NEGATIVE MG/DL
KETONES UR QL: NEGATIVE
LEUKOCYTE EST, POC: ABNORMAL
Lab: ABNORMAL
NITRITE UR-MCNC: NEGATIVE MG/ML
PH UR: 7 [PH] (ref 5–8)
PROT UR STRIP-MCNC: NEGATIVE MG/DL
RBC # UR STRIP: NEGATIVE /UL
SP GR UR: 1.01 (ref 1–1.03)
UROBILINOGEN UR QL: NORMAL

## 2025-02-21 RX ORDER — GINGER ROOT/GINGER ROOT EXT 262.5 MG
1 CAPSULE ORAL 2 TIMES DAILY WITH MEALS
Start: 2025-02-21

## 2025-02-21 NOTE — PROGRESS NOTES
Subjective   Emily Drake is a 69 y.o. female.     Chief Complaint   Patient presents with    Annual Exam     Physical         Current Outpatient Medications:     Calcium Carb-Cholecalciferol (600+D3) 600-20 MG-MCG tablet, Take 1 tablet by mouth 2 (Two) Times a Day With Meals., Disp: , Rfl:     cholecalciferol (VITAMIN D3) 25 MCG (1000 UT) tablet, Take 1 tablet by mouth Daily., Disp: , Rfl:     COLLAGEN PO, 1 po qd, Disp: , Rfl:     Multiple Vitamins-Minerals (CENTRUM SILVER 50+WOMEN) tablet, Daily., Disp: , Rfl:     Tirzepatide 12.5 MG/0.5ML solution auto-injector, Inject 0.5 mL under the skin into the appropriate area as directed Every 7 (Seven) Days., Disp: 6 mL, Rfl: 1    Past Medical History:   Diagnosis Date    DEXA     2023= (-0.7/-0.6); 2025=  (-1.1/ -0.8)    IGT     MAMMO     NEG = 2021----Declines    PAP     NEG = 2020/ 2023       Past Surgical History:   Procedure Laterality Date    COLONOSCOPY      NEG= 2013/ 2022,  Rech 2032                   GSI    DENTAL PROCEDURE      Dental surgery       Family History   Problem Relation Age of Onset    Arthritis Mother     Hypertension Mother     Kidney disease Mother     Hypertension Father     Dementia Father     Diabetes Father     Rheum arthritis Sister     Arthritis Sister         RA/ DDD L Spine    Sjogren's syndrome Sister     Other Sister         Autoimmune Dx like SLE    Diabetes Brother     Heart disease Brother         CABG x3    Atrial fibrillation Brother     Diabetes Other         1ST DEGREE RELATIVE       Social History     Socioeconomic History    Marital status:    Tobacco Use    Smoking status: Never     Passive exposure: Never    Smokeless tobacco: Never   Vaping Use    Vaping status: Never Used   Substance and Sexual Activity    Alcohol use: Never    Drug use: Never    Sexual activity: Defer       History of Present Illness  The patient is a 69-year-old female here for an annual physical exam.    She maintains regular appointments with  her ophthalmologist and dentist. She conducts self-breast examinations monthly, with no concerning findings. She reports no gastrointestinal symptoms such as gas, diarrhea, constipation, black stools, bloody stools, or mucousy stools.     She received her influenza vaccine in November 2024 at her workplace. She is interested in receiving the tetanus vaccine, pending insurance coverage. She is not interested in getting the COVID-19 booster. She is currently employed at the [employer] and does not require a work note. She has a history of ear pain during childhood, which she attributes to probable ear infections.    She has been on Mounjaro 12.5 mg for approximately one year, which has resulted in significant weight loss. She reports no adverse effects from the medication.    She has a history of bunions and calluses on her feet, which have been less bothersome recently. She has previously consulted a podiatrist who recommended footwear with a wide toe box. She expresses concern about the potential progression of her bunions and their impact on her mobility. She has arch supports in her tennis shoes.    She had a recent sinus infection, which was managed with a nasal rinse containing an antibiotic, prescribed by Dr. Cisneros. She reports improvement in her condition following this treatment.    She has been on Mounjaro due to impaired glucose tolerance.    Her dietary intake of calcium is minimal, occasionally consuming milk with cereal or yogurt. She engages in regular walking exercises during the summer months. She prefers to manage her bone health with over-the-counter supplements rather than prescription medications.    SOCIAL HISTORY  She reports no smoking, alcohol, or drug use. She is currently employed at the [employer].    FAMILY HISTORY  She reports no new medical problems for her two sisters and one brother.    ALLERGIES  The patient has no known allergies.    MEDICATIONS  Current: One A Day multivitamin for  women 50+, calcium supplement, vitamin D 1000 IU, collagen, Mounjaro 12.5 mg    IMMUNIZATIONS  She received an influenza vaccine in November 2024. She has completed her pneumococcal and shingles vaccines. She had a tetanus vaccine in 2008.        The following portions of the patient's history were reviewed and updated as appropriate: allergies, current medications, past family history, past medical history, past social history, past surgical history and problem list.    Review of Systems   Constitutional:  Negative for activity change, appetite change, fatigue, unexpected weight gain and unexpected weight loss.   HENT:  Negative for congestion, ear pain, hearing loss, nosebleeds, postnasal drip, rhinorrhea, sinus pressure, sneezing, sore throat, tinnitus and trouble swallowing.    Eyes:  Negative for blurred vision and double vision.   Respiratory:  Negative for cough, shortness of breath and wheezing.    Cardiovascular:  Negative for chest pain.   Gastrointestinal:  Negative for abdominal pain, constipation, diarrhea, nausea, vomiting, GERD and indigestion.   Genitourinary:  Negative for difficulty urinating, dysuria, flank pain, frequency, urgency and urinary incontinence.   Musculoskeletal:  Negative for arthralgias and myalgias.   Skin:  Negative for rash and skin lesions.   Neurological:  Negative for weakness, memory problem and confusion.   Psychiatric/Behavioral:  Negative for agitation, self-injury, suicidal ideas, depressed mood and stress. The patient is not nervous/anxious.        Vitals:    02/21/25 0907   BP: 99/56   Pulse: 76   Resp: 16   Temp: 98 °F (36.7 °C)   SpO2: 98%       Objective   Physical Exam  Vitals and nursing note reviewed.   Constitutional:       General: She is not in acute distress.     Appearance: Normal appearance. She is well-developed. She is not ill-appearing, toxic-appearing or diaphoretic.   HENT:      Head: Normocephalic and atraumatic.      Right Ear: Tympanic membrane, ear  canal and external ear normal. There is no impacted cerumen. Tympanic membrane is scarred.      Left Ear: Tympanic membrane, ear canal and external ear normal. There is no impacted cerumen. Tympanic membrane is scarred.      Nose: Nose normal. No congestion or rhinorrhea.      Mouth/Throat:      Mouth: Mucous membranes are moist.      Pharynx: No oropharyngeal exudate or posterior oropharyngeal erythema.   Eyes:      Extraocular Movements: Extraocular movements intact.      Conjunctiva/sclera: Conjunctivae normal.      Pupils: Pupils are equal, round, and reactive to light.   Cardiovascular:      Rate and Rhythm: Normal rate and regular rhythm.      Heart sounds: Normal heart sounds. No murmur heard.  Pulmonary:      Effort: Pulmonary effort is normal. No respiratory distress.      Breath sounds: Normal breath sounds. No wheezing.   Abdominal:      General: Bowel sounds are normal. There is no distension.      Palpations: Abdomen is soft. There is no mass.      Tenderness: There is no abdominal tenderness. There is no guarding or rebound.      Hernia: No hernia is present.   Musculoskeletal:         General: Normal range of motion.      Cervical back: Normal range of motion and neck supple.      Right lower leg: No edema.      Left lower leg: No edema.      Right foot: Bunion present.      Left foot: Bunion present.   Feet:      Right foot:      Skin integrity: Callus present. No ulcer or skin breakdown.      Left foot:      Skin integrity: Callus present. No ulcer or skin breakdown.   Lymphadenopathy:      Cervical: No cervical adenopathy.   Skin:     General: Skin is warm and dry.      Findings: No rash.   Neurological:      General: No focal deficit present.      Mental Status: She is alert and oriented to person, place, and time.      Cranial Nerves: No cranial nerve deficit.   Psychiatric:         Mood and Affect: Mood normal.         Behavior: Behavior normal.         Thought Content: Thought content normal.          Judgment: Judgment normal.       Physical Exam    Vital Signs  Blood pressure = 99/56.     BMI is >= 25 and <30. (Overweight) The following options were offered after discussion;: exercise counseling/recommendations and nutrition counseling/recommendations      Assessment & Plan   Diagnoses and all orders for this visit:    1. Annual physical exam (Primary)  -     POC Urinalysis Dipstick, Automated    2. IGT (impaired glucose tolerance)    3. Pre-ulcerative corn or callous    4. Bunion    Other orders  -     Calcium Carb-Cholecalciferol (600+D3) 600-20 MG-MCG tablet; Take 1 tablet by mouth 2 (Two) Times a Day With Meals.  -     Tirzepatide 12.5 MG/0.5ML solution auto-injector; Inject 0.5 mL under the skin into the appropriate area as directed Every 7 (Seven) Days.  Dispense: 6 mL; Refill: 1      Assessment & Plan  1. Annual physical examination.  Her blood pressure is notably low at 99/56. Her total cholesterol remains stable at approximately 170, with a slight increase in triglycerides, which are still within acceptable limits. Her HDL has increased slightly, which is beneficial as it offsets her cardiac risk. Her LDL has decreased slightly. Her blood glucose levels, kidney function, liver function, and vitamin D levels are all within normal ranges. She has completed her pneumococcal and shingles vaccinations. Her last tetanus vaccination was in 2008. She is not eligible for the RSV vaccine due to her age and lack of respiratory issues, heart disease, or diabetes. She will continue her current regimen of vitamin D 1000 IU daily. She will inquire with her insurance provider regarding coverage for the tetanus vaccine. She will maintain her annual ophthalmology and dental appointments.    2. Osteopenia.  Her bone density scan from 2023 indicates progression of osteopenia, particularly in the neck of the femur. The spine went from -0.6 to -0.8, which is still in the normal range, but the hips went from -0.7 to  -1.11, indicating osteopenia. Weightbearing exercises such as walking are recommended to enhance bone strength in the hips. She is advised to ensure adequate calcium intake through diet or supplements, aiming for a total of 1200 mg per day. If dietary intake is insufficient, she should take a 600 mg supplement twice daily. She will continue her current regimen of vitamin D 1000 IU daily. A handout detailing dietary recommendations for osteopenia has been provided.    3. Sinus infection.  She recently had a sinus infection treated with a nasal rinse containing an antibiotic, prescribed by Dr. Raza. She reports improvement in her condition following this treatment.    4. Bunions and calluses.  She has a history of bunions and calluses on her feet, which have been less bothersome recently. She has previously consulted a podiatrist who recommended footwear with a wide toe box. She expresses concern about the potential progression of her bunions and their impact on her mobility. She is advised to continue using wide toe box shoes and arch supports to manage her symptoms. Surgery is not recommended at this time due to her hesitancy and the potential for recurrence.    5. Impaired glucose tolerance.  She is on Mounjaro due to impaired glucose tolerance. A prescription for Mounjaro 12.5 mg has been sent to [facility].    6. Weight management.  She has been on Mounjaro 12.5 mg for approximately one year, which has resulted in significant weight loss. She reports no adverse effects from the medication. A prescription for Mounjaro 12.5 mg has been sent to [facility].     Results  Laboratory Studies  Total cholesterol is about 170. Triglycerides are slightly elevated. HDL increased slightly. LDL decreased slightly. Blood sugar was normal. Kidney function was normal. Liver function was normal. Vitamin D level was 47.    Imaging  Bone density test showed progression from -0.6 to -0.8 in the spine, and from -0.7 to -1.11  in the hips.          Patient or patient representative verbalized consent for the use of Ambient Listening during the visit with  Maria Teresa Cantor DO for chart documentation. 2/23/2025  22:07 EST

## 2025-02-24 ENCOUNTER — CLINICAL SUPPORT (OUTPATIENT)
Dept: FAMILY MEDICINE CLINIC | Facility: CLINIC | Age: 70
End: 2025-02-24
Payer: COMMERCIAL

## 2025-02-24 ENCOUNTER — TELEPHONE (OUTPATIENT)
Dept: FAMILY MEDICINE CLINIC | Facility: CLINIC | Age: 70
End: 2025-02-24
Payer: COMMERCIAL

## 2025-02-24 DIAGNOSIS — Z23 IMMUNIZATION DUE: ICD-10-CM

## 2025-02-24 DIAGNOSIS — Z23 IMMUNIZATION DUE: Primary | ICD-10-CM

## 2025-02-24 PROCEDURE — 90471 IMMUNIZATION ADMIN: CPT | Performed by: FAMILY MEDICINE

## 2025-02-24 PROCEDURE — 90715 TDAP VACCINE 7 YRS/> IM: CPT | Performed by: FAMILY MEDICINE
